# Patient Record
Sex: MALE
[De-identification: names, ages, dates, MRNs, and addresses within clinical notes are randomized per-mention and may not be internally consistent; named-entity substitution may affect disease eponyms.]

---

## 2022-07-26 PROBLEM — Z00.00 ENCOUNTER FOR PREVENTIVE HEALTH EXAMINATION: Status: ACTIVE | Noted: 2022-07-26

## 2022-07-27 ENCOUNTER — APPOINTMENT (OUTPATIENT)
Dept: SURGICAL ONCOLOGY | Facility: CLINIC | Age: 37
End: 2022-07-27

## 2022-07-27 VITALS
BODY MASS INDEX: 26.22 KG/M2 | SYSTOLIC BLOOD PRESSURE: 141 MMHG | HEART RATE: 81 BPM | HEIGHT: 69 IN | RESPIRATION RATE: 17 BRPM | OXYGEN SATURATION: 99 % | WEIGHT: 177 LBS | DIASTOLIC BLOOD PRESSURE: 95 MMHG | TEMPERATURE: 98.7 F

## 2022-07-27 DIAGNOSIS — Z86.39 PERSONAL HISTORY OF OTHER ENDOCRINE, NUTRITIONAL AND METABOLIC DISEASE: ICD-10-CM

## 2022-07-27 DIAGNOSIS — Z86.79 PERSONAL HISTORY OF OTHER DISEASES OF THE CIRCULATORY SYSTEM: ICD-10-CM

## 2022-07-27 DIAGNOSIS — Z78.9 OTHER SPECIFIED HEALTH STATUS: ICD-10-CM

## 2022-07-27 PROCEDURE — 99204 OFFICE O/P NEW MOD 45 MIN: CPT

## 2022-08-01 ENCOUNTER — OUTPATIENT (OUTPATIENT)
Dept: OUTPATIENT SERVICES | Facility: HOSPITAL | Age: 37
LOS: 1 days | End: 2022-08-01

## 2022-08-01 VITALS
RESPIRATION RATE: 16 BRPM | HEIGHT: 68 IN | HEART RATE: 86 BPM | DIASTOLIC BLOOD PRESSURE: 90 MMHG | TEMPERATURE: 99 F | WEIGHT: 182.1 LBS | SYSTOLIC BLOOD PRESSURE: 130 MMHG

## 2022-08-01 DIAGNOSIS — E11.9 TYPE 2 DIABETES MELLITUS WITHOUT COMPLICATIONS: ICD-10-CM

## 2022-08-01 DIAGNOSIS — K76.9 LIVER DISEASE, UNSPECIFIED: ICD-10-CM

## 2022-08-01 DIAGNOSIS — I10 ESSENTIAL (PRIMARY) HYPERTENSION: ICD-10-CM

## 2022-08-01 DIAGNOSIS — Q24.9 CONGENITAL MALFORMATION OF HEART, UNSPECIFIED: ICD-10-CM

## 2022-08-01 DIAGNOSIS — J45.909 UNSPECIFIED ASTHMA, UNCOMPLICATED: ICD-10-CM

## 2022-08-01 DIAGNOSIS — G47.33 OBSTRUCTIVE SLEEP APNEA (ADULT) (PEDIATRIC): ICD-10-CM

## 2022-08-01 LAB
ALBUMIN SERPL ELPH-MCNC: 4.8 G/DL — SIGNIFICANT CHANGE UP (ref 3.3–5)
ALP SERPL-CCNC: 43 U/L — SIGNIFICANT CHANGE UP (ref 40–120)
ALT FLD-CCNC: 26 U/L — SIGNIFICANT CHANGE UP (ref 4–41)
ANION GAP SERPL CALC-SCNC: 15 MMOL/L — HIGH (ref 7–14)
AST SERPL-CCNC: 18 U/L — SIGNIFICANT CHANGE UP (ref 4–40)
BILIRUB SERPL-MCNC: 0.2 MG/DL — SIGNIFICANT CHANGE UP (ref 0.2–1.2)
BLD GP AB SCN SERPL QL: NEGATIVE — SIGNIFICANT CHANGE UP
BUN SERPL-MCNC: 18 MG/DL — SIGNIFICANT CHANGE UP (ref 7–23)
CALCIUM SERPL-MCNC: 9.6 MG/DL — SIGNIFICANT CHANGE UP (ref 8.4–10.5)
CHLORIDE SERPL-SCNC: 102 MMOL/L — SIGNIFICANT CHANGE UP (ref 98–107)
CO2 SERPL-SCNC: 24 MMOL/L — SIGNIFICANT CHANGE UP (ref 22–31)
CREAT SERPL-MCNC: 1.07 MG/DL — SIGNIFICANT CHANGE UP (ref 0.5–1.3)
EGFR: 91 ML/MIN/1.73M2 — SIGNIFICANT CHANGE UP
GLUCOSE SERPL-MCNC: 136 MG/DL — HIGH (ref 70–99)
HCT VFR BLD CALC: 40.7 % — SIGNIFICANT CHANGE UP (ref 39–50)
HGB BLD-MCNC: 13.1 G/DL — SIGNIFICANT CHANGE UP (ref 13–17)
MCHC RBC-ENTMCNC: 24.8 PG — LOW (ref 27–34)
MCHC RBC-ENTMCNC: 32.2 GM/DL — SIGNIFICANT CHANGE UP (ref 32–36)
MCV RBC AUTO: 76.9 FL — LOW (ref 80–100)
NRBC # BLD: 0 /100 WBCS — SIGNIFICANT CHANGE UP
NRBC # FLD: 0 K/UL — SIGNIFICANT CHANGE UP
PLATELET # BLD AUTO: 289 K/UL — SIGNIFICANT CHANGE UP (ref 150–400)
POTASSIUM SERPL-MCNC: 3.4 MMOL/L — LOW (ref 3.5–5.3)
POTASSIUM SERPL-SCNC: 3.4 MMOL/L — LOW (ref 3.5–5.3)
PROT SERPL-MCNC: 7.4 G/DL — SIGNIFICANT CHANGE UP (ref 6–8.3)
RBC # BLD: 5.29 M/UL — SIGNIFICANT CHANGE UP (ref 4.2–5.8)
RBC # FLD: 14.1 % — SIGNIFICANT CHANGE UP (ref 10.3–14.5)
RH IG SCN BLD-IMP: POSITIVE — SIGNIFICANT CHANGE UP
SODIUM SERPL-SCNC: 141 MMOL/L — SIGNIFICANT CHANGE UP (ref 135–145)
WBC # BLD: 7.65 K/UL — SIGNIFICANT CHANGE UP (ref 3.8–10.5)
WBC # FLD AUTO: 7.65 K/UL — SIGNIFICANT CHANGE UP (ref 3.8–10.5)

## 2022-08-01 RX ORDER — SODIUM CHLORIDE 9 MG/ML
1000 INJECTION, SOLUTION INTRAVENOUS
Refills: 0 | Status: DISCONTINUED | OUTPATIENT
Start: 2022-08-05 | End: 2022-08-06

## 2022-08-01 NOTE — H&P PST ADULT - PROBLEM SELECTOR PLAN 1
Scheduled for robotic partial liver resection ,cholecystectomy   Preop instructions provided and patient verbalizes understanding.  Labs done and results pending. Hibiclens provided with instructions and was signed by patient. Teach-back method was utilized to assess patient's understanding. Patient verbalized understanding.    Covid test scheduled or tomorrow with pcp , as stated by patient .   Medical clearance as per Dr Bran , pcp has requested cardiac clearance . PST requesting both evaluations with current EKG to be faxed .

## 2022-08-01 NOTE — H&P PST ADULT - PROBLEM SELECTOR PLAN 4
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 Requesting cardiac clearance to be faxed with clarity of history .

## 2022-08-01 NOTE — H&P PST ADULT - NSICDXPASTMEDICALHX_GEN_ALL_CORE_FT
PAST MEDICAL HISTORY:  Asthma mild - no inhaler    Congenital heart disease cardiac eval 1 year ago    Hypercholesteremia     Hypertension     Type 2 diabetes mellitus

## 2022-08-01 NOTE — H&P PST ADULT - HISTORY OF PRESENT ILLNESS
This is a 36 y.o. male with liver disease unspecified , cholesterolosis of gallbladder . Pt had abnormal abdominal sono 2020 , follow up MRI  done , pt with serial sono of abdomen every 6 months . Pt had last 6/22 , follow up MRI of abdomen , chest CT 7/22. Pt referred to Dr Bran . Pt is scheduled for surgery 8/5/22. Pt offers no complaints in pst.

## 2022-08-01 NOTE — H&P PST ADULT - PROBLEM SELECTOR PLAN 3
bloods pending in pst . Last metformin 8/4 in am . STAT FS upon admit , monitor FS during hospital stay.

## 2022-08-03 NOTE — PHYSICAL EXAM
[Normal] : supple, no neck mass and thyroid not enlarged [Normal Neck Lymph Nodes] : normal neck lymph nodes  [Normal Supraclavicular Lymph Nodes] : normal supraclavicular lymph nodes [Normal Groin Lymph Nodes] : normal groin lymph nodes [Normal Axillary Lymph Nodes] : normal axillary lymph nodes [Normal] : oriented to person, place and time, with appropriate affect [FreeTextEntry1] : COVID-19 precautions as per [facility] policy was universally followed

## 2022-08-03 NOTE — CONSULT LETTER
[Dear  ___] : Dear  [unfilled], [( Thank you for referring [unfilled] for consultation for _____ )] : Thank you for referring [unfilled] for consultation for [unfilled] [Please see my note below.] : Please see my note below. [Consult Closing:] : Thank you very much for allowing me to participate in the care of this patient.  If you have any questions, please do not hesitate to contact me. [Sincerely,] : Sincerely, [FreeTextEntry2] : Howard Ferraro MD [FreeTextEntry3] : Shant Crockett MD, FICS, FACS\par Director of Surgical Oncology- [facility] \par , Department of Surgery  \par [facility] \par [address]\[place] [address]\par \par [address]\[place] [address]\par \par [address]\[place] [address]\par \par (mob) [phone]\par (o) [phone]\par (f) [phone]\par

## 2022-08-03 NOTE — HISTORY OF PRESENT ILLNESS
[de-identified] : Mr. DEENA UMANZOR is a 36 year old man, referred by Dr. Howard Ferraro, presents today for an initial consultation for a liver mass. \par \par Deena initially complained of intermittent abdominal pain (LUQ) in 2020, that was noted to be gallbladder polyps (imaging & report not available). He denies any abdominal pain, nausea, vomiting or associated symptoms. He is planning on moving [facility] at the end of the summer which he states prompted a repeat abdominal ultrasound (7/8/2022)\par US showed: indeterminate liver lesion, small echogenic areas in the gallbladder (likely polyps), simple appearing bilateral kidney cysts.\par \par A follow-up abdominal MRI on 7/22/2022 showed liver lesion in the lateral segment left lobe that demonstrates arterial enhancement measuring up to 2.0 x 2.0 cm. By the portal venous phase there is evidence of washout and pseudocapsule. The lesion appears mildly T2 hyperintense, there is no significant associated restricted diffusion, no definite fat identified within the lesion, lesion was present on prior CT angiogram from 2020 at that time it measured 1.7 cm \par \par CEA & AFP WNL, he reports a normal hepatitis panel done by Dr. Ferraro \par \par He reports only symptom of vague LUQ pain that is intermittent but has persisted over the last 2 years (attributed to gallbladder polyps). Denies nausea, vomiting, diarrhea or weight loss. Reports regular appetite and weight is well maintained, along with regular bowel movements.

## 2022-08-03 NOTE — ASSESSMENT
[FreeTextEntry1] : IMP:\par 35yo M with liver lesion, first noted in 2020 measuring 1.7 cm\par now measuring 2.0 x 2.0 cm in 2022, and multiple gallbladder polyps (reports frequent LUQ pain)\par \par Discussed options of biopsy to lesion/watchful waiting vs surgical resection, patient & wife present, both in agreement to proceed with surgery. \par \par PLAN:\par robotic partial liver resection & cholecystectomy\par \par I have discussed the risks, benefits, alternatives, complications including but not limited bleeding, infection, damage to adjacent structures, sepsis, need for further procedures, sepsis, tumor recurrence to the patient in detail. Patient expressed verbal understanding. Written informed consent to be obtained in the preoperative period \par \par \par I have discussed the diagnosis, therapeutic plan and options with the patient at length. Patient expressed verbal understanding to proceed with the proposed plan. All questions answered.

## 2022-08-04 NOTE — ASU PATIENT PROFILE, ADULT - FALL HARM RISK - UNIVERSAL INTERVENTIONS
Bed in lowest position, wheels locked, appropriate side rails in place/Call bell, personal items and telephone in reach/Instruct patient to call for assistance before getting out of bed or chair/Non-slip footwear when patient is out of bed/[place] to call system/Physically safe environment - no spills, clutter or unnecessary equipment/Purposeful Proactive Rounding/Room/bathroom lighting operational, light cord in reach

## 2022-08-05 ENCOUNTER — RESULT REVIEW (OUTPATIENT)
Age: 37
End: 2022-08-05

## 2022-08-05 ENCOUNTER — INPATIENT (INPATIENT)
Facility: HOSPITAL | Age: 37
LOS: 3 days | Discharge: HOME | End: 2022-08-09
Attending: SURGERY | Admitting: SURGERY

## 2022-08-05 ENCOUNTER — APPOINTMENT (OUTPATIENT)
Dept: SURGICAL ONCOLOGY | Facility: HOSPITAL | Age: 37
End: 2022-08-05

## 2022-08-05 ENCOUNTER — TRANSCRIPTION ENCOUNTER (OUTPATIENT)
Age: 37
End: 2022-08-05

## 2022-08-05 VITALS
SYSTOLIC BLOOD PRESSURE: 150 MMHG | TEMPERATURE: 98 F | HEIGHT: 68 IN | WEIGHT: 182.1 LBS | RESPIRATION RATE: 16 BRPM | OXYGEN SATURATION: 100 % | DIASTOLIC BLOOD PRESSURE: 95 MMHG | HEART RATE: 82 BPM

## 2022-08-05 DIAGNOSIS — K76.9 LIVER DISEASE, UNSPECIFIED: ICD-10-CM

## 2022-08-05 LAB
ANION GAP SERPL CALC-SCNC: 12 MMOL/L — SIGNIFICANT CHANGE UP (ref 7–14)
BUN SERPL-MCNC: 14 MG/DL — SIGNIFICANT CHANGE UP (ref 7–23)
CALCIUM SERPL-MCNC: 9.3 MG/DL — SIGNIFICANT CHANGE UP (ref 8.4–10.5)
CHLORIDE SERPL-SCNC: 102 MMOL/L — SIGNIFICANT CHANGE UP (ref 98–107)
CO2 SERPL-SCNC: 25 MMOL/L — SIGNIFICANT CHANGE UP (ref 22–31)
CREAT SERPL-MCNC: 1.04 MG/DL — SIGNIFICANT CHANGE UP (ref 0.5–1.3)
EGFR: 94 ML/MIN/1.73M2 — SIGNIFICANT CHANGE UP
GLUCOSE BLDC GLUCOMTR-MCNC: 138 MG/DL — HIGH (ref 70–99)
GLUCOSE BLDC GLUCOMTR-MCNC: 158 MG/DL — HIGH (ref 70–99)
GLUCOSE SERPL-MCNC: 150 MG/DL — HIGH (ref 70–99)
HCT VFR BLD CALC: 38.4 % — LOW (ref 39–50)
HGB BLD-MCNC: 12.8 G/DL — LOW (ref 13–17)
MAGNESIUM SERPL-MCNC: 1.8 MG/DL — SIGNIFICANT CHANGE UP (ref 1.6–2.6)
MCHC RBC-ENTMCNC: 25.4 PG — LOW (ref 27–34)
MCHC RBC-ENTMCNC: 33.3 GM/DL — SIGNIFICANT CHANGE UP (ref 32–36)
MCV RBC AUTO: 76.3 FL — LOW (ref 80–100)
NRBC # BLD: 0 /100 WBCS — SIGNIFICANT CHANGE UP
NRBC # FLD: 0 K/UL — SIGNIFICANT CHANGE UP
PHOSPHATE SERPL-MCNC: 2.9 MG/DL — SIGNIFICANT CHANGE UP (ref 2.5–4.5)
PLATELET # BLD AUTO: 226 K/UL — SIGNIFICANT CHANGE UP (ref 150–400)
POTASSIUM SERPL-MCNC: 3.7 MMOL/L — SIGNIFICANT CHANGE UP (ref 3.5–5.3)
POTASSIUM SERPL-SCNC: 3.7 MMOL/L — SIGNIFICANT CHANGE UP (ref 3.5–5.3)
RBC # BLD: 5.03 M/UL — SIGNIFICANT CHANGE UP (ref 4.2–5.8)
RBC # FLD: 14.1 % — SIGNIFICANT CHANGE UP (ref 10.3–14.5)
RH IG SCN BLD-IMP: POSITIVE — SIGNIFICANT CHANGE UP
SODIUM SERPL-SCNC: 139 MMOL/L — SIGNIFICANT CHANGE UP (ref 135–145)
WBC # BLD: 10.88 K/UL — HIGH (ref 3.8–10.5)
WBC # FLD AUTO: 10.88 K/UL — HIGH (ref 3.8–10.5)

## 2022-08-05 PROCEDURE — 49905 OMENTAL FLAP INTRA-ABDOM: CPT

## 2022-08-05 PROCEDURE — 88304 TISSUE EXAM BY PATHOLOGIST: CPT | Mod: 26

## 2022-08-05 PROCEDURE — 88313 SPECIAL STAINS GROUP 2: CPT | Mod: 26

## 2022-08-05 PROCEDURE — 88341 IMHCHEM/IMCYTCHM EA ADD ANTB: CPT | Mod: 26

## 2022-08-05 PROCEDURE — 88334 PATH CONSLTJ SURG CYTO XM EA: CPT | Mod: 26,59

## 2022-08-05 PROCEDURE — 88331 PATH CONSLTJ SURG 1 BLK 1SPC: CPT | Mod: 26

## 2022-08-05 PROCEDURE — 88342 IMHCHEM/IMCYTCHM 1ST ANTB: CPT | Mod: 26

## 2022-08-05 PROCEDURE — 76998 US GUIDE INTRAOP: CPT | Mod: 26

## 2022-08-05 PROCEDURE — 47120 PARTIAL REMOVAL OF LIVER: CPT

## 2022-08-05 PROCEDURE — 88307 TISSUE EXAM BY PATHOLOGIST: CPT | Mod: 26

## 2022-08-05 PROCEDURE — S2900 ROBOTIC SURGICAL SYSTEM: CPT | Mod: NC

## 2022-08-05 PROCEDURE — 93010 ELECTROCARDIOGRAM REPORT: CPT

## 2022-08-05 DEVICE — SURGICEL 2 X 14": Type: IMPLANTABLE DEVICE | Status: FUNCTIONAL

## 2022-08-05 DEVICE — LIGATING CLIPS WECK HEMOLOK POLYMER LARGE (PURPLE) 6: Type: IMPLANTABLE DEVICE | Status: FUNCTIONAL

## 2022-08-05 DEVICE — VISTASEAL FIBRIN HUMAN 10ML: Type: IMPLANTABLE DEVICE | Status: FUNCTIONAL

## 2022-08-05 RX ORDER — METOCLOPRAMIDE HCL 10 MG
10 TABLET ORAL ONCE
Refills: 0 | Status: COMPLETED | OUTPATIENT
Start: 2022-08-05 | End: 2022-08-06

## 2022-08-05 RX ORDER — CEFOTETAN DISODIUM 1 G
2 VIAL (EA) INJECTION EVERY 12 HOURS
Refills: 0 | Status: COMPLETED | OUTPATIENT
Start: 2022-08-06 | End: 2022-08-06

## 2022-08-05 RX ORDER — LANOLIN ALCOHOL/MO/W.PET/CERES
6 CREAM (GRAM) TOPICAL ONCE
Refills: 0 | Status: COMPLETED | OUTPATIENT
Start: 2022-08-05 | End: 2022-08-06

## 2022-08-05 RX ORDER — MONTELUKAST 4 MG/1
10 TABLET, CHEWABLE ORAL DAILY
Refills: 0 | Status: DISCONTINUED | OUTPATIENT
Start: 2022-08-05 | End: 2022-08-09

## 2022-08-05 RX ORDER — ENOXAPARIN SODIUM 100 MG/ML
40 INJECTION SUBCUTANEOUS EVERY 24 HOURS
Refills: 0 | Status: DISCONTINUED | OUTPATIENT
Start: 2022-08-05 | End: 2022-08-09

## 2022-08-05 RX ORDER — ACETAMINOPHEN 500 MG
2 TABLET ORAL
Qty: 0 | Refills: 0 | DISCHARGE
Start: 2022-08-05

## 2022-08-05 RX ORDER — SIMETHICONE 80 MG/1
80 TABLET, CHEWABLE ORAL ONCE
Refills: 0 | Status: COMPLETED | OUTPATIENT
Start: 2022-08-05 | End: 2022-08-05

## 2022-08-05 RX ORDER — FENTANYL CITRATE 50 UG/ML
50 INJECTION INTRAVENOUS
Refills: 0 | Status: DISCONTINUED | OUTPATIENT
Start: 2022-08-05 | End: 2022-08-05

## 2022-08-05 RX ORDER — SODIUM CHLORIDE 9 MG/ML
1000 INJECTION, SOLUTION INTRAVENOUS
Refills: 0 | Status: DISCONTINUED | OUTPATIENT
Start: 2022-08-05 | End: 2022-08-09

## 2022-08-05 RX ORDER — OXYCODONE HYDROCHLORIDE 5 MG/1
5 TABLET ORAL ONCE
Refills: 0 | Status: DISCONTINUED | OUTPATIENT
Start: 2022-08-05 | End: 2022-08-05

## 2022-08-05 RX ORDER — MONTELUKAST 4 MG/1
1 TABLET, CHEWABLE ORAL
Qty: 0 | Refills: 0 | DISCHARGE

## 2022-08-05 RX ORDER — DEXTROSE 50 % IN WATER 50 %
25 SYRINGE (ML) INTRAVENOUS ONCE
Refills: 0 | Status: DISCONTINUED | OUTPATIENT
Start: 2022-08-05 | End: 2022-08-09

## 2022-08-05 RX ORDER — FOLIC ACID 0.8 MG
1 TABLET ORAL
Qty: 0 | Refills: 0 | DISCHARGE

## 2022-08-05 RX ORDER — METFORMIN HYDROCHLORIDE 850 MG/1
1 TABLET ORAL
Qty: 0 | Refills: 0 | DISCHARGE

## 2022-08-05 RX ORDER — CYCLOBENZAPRINE HYDROCHLORIDE 10 MG/1
10 TABLET, FILM COATED ORAL ONCE
Refills: 0 | Status: COMPLETED | OUTPATIENT
Start: 2022-08-05 | End: 2022-08-05

## 2022-08-05 RX ORDER — ACETAMINOPHEN 500 MG
975 TABLET ORAL EVERY 6 HOURS
Refills: 0 | Status: DISCONTINUED | OUTPATIENT
Start: 2022-08-05 | End: 2022-08-09

## 2022-08-05 RX ORDER — FENOFIBRATE,MICRONIZED 130 MG
145 CAPSULE ORAL DAILY
Refills: 0 | Status: DISCONTINUED | OUTPATIENT
Start: 2022-08-05 | End: 2022-08-09

## 2022-08-05 RX ORDER — ACETAMINOPHEN 500 MG
650 TABLET ORAL EVERY 6 HOURS
Refills: 0 | Status: DISCONTINUED | OUTPATIENT
Start: 2022-08-05 | End: 2022-08-05

## 2022-08-05 RX ORDER — LOSARTAN/HYDROCHLOROTHIAZIDE 100MG-25MG
1 TABLET ORAL
Qty: 0 | Refills: 0 | DISCHARGE

## 2022-08-05 RX ORDER — INSULIN LISPRO 100/ML
VIAL (ML) SUBCUTANEOUS AT BEDTIME
Refills: 0 | Status: DISCONTINUED | OUTPATIENT
Start: 2022-08-05 | End: 2022-08-09

## 2022-08-05 RX ORDER — GLUCAGON INJECTION, SOLUTION 0.5 MG/.1ML
1 INJECTION, SOLUTION SUBCUTANEOUS ONCE
Refills: 0 | Status: DISCONTINUED | OUTPATIENT
Start: 2022-08-05 | End: 2022-08-09

## 2022-08-05 RX ORDER — MAGNESIUM SULFATE 500 MG/ML
1 VIAL (ML) INJECTION ONCE
Refills: 0 | Status: COMPLETED | OUTPATIENT
Start: 2022-08-05 | End: 2022-08-05

## 2022-08-05 RX ORDER — INSULIN LISPRO 100/ML
VIAL (ML) SUBCUTANEOUS
Refills: 0 | Status: DISCONTINUED | OUTPATIENT
Start: 2022-08-05 | End: 2022-08-09

## 2022-08-05 RX ORDER — DEXTROSE 50 % IN WATER 50 %
15 SYRINGE (ML) INTRAVENOUS ONCE
Refills: 0 | Status: DISCONTINUED | OUTPATIENT
Start: 2022-08-05 | End: 2022-08-09

## 2022-08-05 RX ORDER — OXYCODONE HYDROCHLORIDE 5 MG/1
10 TABLET ORAL ONCE
Refills: 0 | Status: DISCONTINUED | OUTPATIENT
Start: 2022-08-05 | End: 2022-08-05

## 2022-08-05 RX ORDER — POTASSIUM CHLORIDE 20 MEQ
20 PACKET (EA) ORAL ONCE
Refills: 0 | Status: COMPLETED | OUTPATIENT
Start: 2022-08-05 | End: 2022-08-07

## 2022-08-05 RX ORDER — FENOFIBRATE,MICRONIZED 130 MG
1 CAPSULE ORAL
Qty: 0 | Refills: 0 | DISCHARGE

## 2022-08-05 RX ORDER — FENTANYL CITRATE 50 UG/ML
25 INJECTION INTRAVENOUS
Refills: 0 | Status: DISCONTINUED | OUTPATIENT
Start: 2022-08-05 | End: 2022-08-05

## 2022-08-05 RX ORDER — DEXTROSE 50 % IN WATER 50 %
12.5 SYRINGE (ML) INTRAVENOUS ONCE
Refills: 0 | Status: DISCONTINUED | OUTPATIENT
Start: 2022-08-05 | End: 2022-08-09

## 2022-08-05 RX ORDER — ONDANSETRON 8 MG/1
4 TABLET, FILM COATED ORAL ONCE
Refills: 0 | Status: COMPLETED | OUTPATIENT
Start: 2022-08-05 | End: 2022-08-05

## 2022-08-05 RX ADMIN — Medication 2: at 16:14

## 2022-08-05 RX ADMIN — FENTANYL CITRATE 50 MICROGRAM(S): 50 INJECTION INTRAVENOUS at 17:15

## 2022-08-05 RX ADMIN — SODIUM CHLORIDE 75 MILLILITER(S): 9 INJECTION, SOLUTION INTRAVENOUS at 16:14

## 2022-08-05 RX ADMIN — FENTANYL CITRATE 50 MICROGRAM(S): 50 INJECTION INTRAVENOUS at 18:07

## 2022-08-05 RX ADMIN — Medication 100 GRAM(S): at 17:40

## 2022-08-05 RX ADMIN — Medication 650 MILLIGRAM(S): at 18:59

## 2022-08-05 RX ADMIN — SIMETHICONE 80 MILLIGRAM(S): 80 TABLET, CHEWABLE ORAL at 21:15

## 2022-08-05 RX ADMIN — ONDANSETRON 4 MILLIGRAM(S): 8 TABLET, FILM COATED ORAL at 19:59

## 2022-08-05 RX ADMIN — FENTANYL CITRATE 50 MICROGRAM(S): 50 INJECTION INTRAVENOUS at 16:53

## 2022-08-05 RX ADMIN — FENTANYL CITRATE 50 MICROGRAM(S): 50 INJECTION INTRAVENOUS at 18:59

## 2022-08-05 RX ADMIN — CYCLOBENZAPRINE HYDROCHLORIDE 10 MILLIGRAM(S): 10 TABLET, FILM COATED ORAL at 19:46

## 2022-08-05 RX ADMIN — Medication 650 MILLIGRAM(S): at 18:27

## 2022-08-05 NOTE — PATIENT PROFILE ADULT - FALL HARM RISK - HARM RISK INTERVENTIONS

## 2022-08-05 NOTE — DISCHARGE NOTE PROVIDER - NSDCMRMEDTOKEN_GEN_ALL_CORE_FT
acetaminophen 325 mg oral tablet: 2 tab(s) orally every 6 hours  fenofibrate 145 mg oral tablet: 1 tab(s) orally once a day, pm  folic acid 1 mg oral tablet: 1 tab(s) orally once a day, pm  losartan-hydrochlorothiazide 100 mg-25 mg oral tablet: 1 tab(s) orally once a day, pm  metFORMIN 1000 mg oral tablet: 1 tab(s) orally once a day, pm  montelukast 10 mg oral tablet: 1 tab(s) orally once a day, pm   acetaminophen 325 mg oral tablet: 2 tab(s) orally every 6 hours  cyclobenzaprine 5 mg oral tablet: 1 tab(s) orally every 8 hours, As needed, Muscle Spasm MDD:3 tabs  fenofibrate 145 mg oral tablet: 1 tab(s) orally once a day, pm  folic acid 1 mg oral tablet: 1 tab(s) orally once a day, pm  gabapentin 300 mg oral capsule: 1 cap(s) orally 3 times a day MDD:3 tabs  losartan-hydrochlorothiazide 100 mg-25 mg oral tablet: 1 tab(s) orally once a day, pm  metFORMIN 1000 mg oral tablet: 1 tab(s) orally once a day, pm  montelukast 10 mg oral tablet: 1 tab(s) orally once a day, pm   cyclobenzaprine 5 mg oral tablet: 1 tab(s) orally every 8 hours, As needed, Muscle Spasm MDD:3 tabs  fenofibrate 145 mg oral tablet: 1 tab(s) orally once a day, pm  folic acid 1 mg oral tablet: 1 tab(s) orally once a day, pm  gabapentin 300 mg oral capsule: 1 cap(s) orally 3 times a day MDD:3 tabs  losartan-hydrochlorothiazide 100 mg-25 mg oral tablet: 1 tab(s) orally once a day, pm  metFORMIN 1000 mg oral tablet: 1 tab(s) orally once a day, pm  montelukast 10 mg oral tablet: 1 tab(s) orally once a day, pm  oxyCODONE 5 mg oral tablet: 1 tab(s) orally every 6 hours, As Needed -for moderate pain MDD:4  Tylenol 500 mg oral tablet: 2 tab(s) orally every 6 hours for mild pain

## 2022-08-05 NOTE — DISCHARGE NOTE PROVIDER - CARE PROVIDER_API CALL
Shant Hunter)  Surgery  [address], Division [place] [address]  Phone: [phone]  Fax: [phone]  Follow Up Time: 1 week

## 2022-08-05 NOTE — DISCHARGE NOTE PROVIDER - NSDCACTIVITY_GEN_ALL_CORE
Showering allowed/No heavy lifting/straining/Follow Instructions Provided by your Surgical Team Do not drive or operate machinery/Showering allowed/Stairs allowed/Walking - Indoors allowed/No heavy lifting/straining/Walking - Outdoors allowed/Follow Instructions Provided by your Surgical Team

## 2022-08-05 NOTE — BRIEF OPERATIVE NOTE - NSICDXBRIEFPROCEDURE_GEN_ALL_CORE_FT
PROCEDURES:  Robot-assisted cholecystectomy 05-Aug-2022 15:10:07  Paris Ya  Robot-assisted laparoscopic wedge resection of liver using da Karrie Si 05-Aug-2022 15:10:23  Paris Ya

## 2022-08-05 NOTE — BRIEF OPERATIVE NOTE - NSICDXBRIEFPREOP_GEN_ALL_CORE_FT
PRE-OP DIAGNOSIS:  Gallbladder polyp 05-Aug-2022 15:10:33  Paris Ya  Liver lesion 05-Aug-2022 15:10:53  Paris Ya

## 2022-08-05 NOTE — DISCHARGE NOTE PROVIDER - NSDCCPCAREPLAN_GEN_ALL_CORE_FT
PRINCIPAL DISCHARGE DIAGNOSIS  Diagnosis: Liver disease, unspecified  Assessment and Plan of Treatment: WOUND CARE:  Please keep incisions clean and dry. Please do not Scrub or rub incisions. Do not use lotion or powder on incisions.   BATHING: You may shower and/or sponge bathe. You may use warm soapy water in the shower and rinse, pat dry.  ACTIVITY: No heavy lifting or straining. Otherwise, you may return to your usual level of physical activity. If you are taking narcotic pain medication DO NOT drive a car, operate machinery or make important decisions.  DIET: Return to your usual diet.  NOTIFY YOUR SURGEON IF YOU HAVE: any bleeding that does not stop, any pus draining from your wound(s), any fever (over 100.4 F) persistent nausea/vomiting, or if your pain is not controlled on your discharge pain medications, unable to urinate.  FOLLOW UP:  1. Please follow up with your primary care physician in one week regarding your hospitalization, bring copies of your discharge paperwork.  2. Please follow up with your surgeon, Dr. Crockett. Call [phone] to make an appointment.

## 2022-08-05 NOTE — PATIENT PROFILE ADULT - PATIENT'S PREFERRED PRONOUN
Him/He Trilobed Flap Text: The defect edges were debeveled with a #15 scalpel blade.  Given the location of the defect and the proximity to free margins a trilobed flap was deemed most appropriate.  Using a sterile surgical marker, an appropriate trilobed flap drawn around the defect.    The area thus outlined was incised deep to adipose tissue with a #15 scalpel blade.  The skin margins were undermined to an appropriate distance in all directions utilizing iris scissors.

## 2022-08-05 NOTE — BRIEF OPERATIVE NOTE - OPERATION/FINDINGS
wedge resection of segment 3 of liver, cholecystectomy performed, cystic duct and artery identified and clipped, gallbladder removed from liver bed, hemostasis achieved

## 2022-08-05 NOTE — DISCHARGE NOTE PROVIDER - HOSPITAL COURSE
Patient presented to [facility] On 8/5/22 with gallbladder polyp and liver lesion; scheduled surgery with Dr. Crockett. Patient taken to the OR by Dr. Crockett and underwent wedge resection of segment 3 of liver, cholecystectomy. Patient tolerated operation well and there were no post-operative complications identified. Patient remained hemodynamically stable in the PACU and transferred to the surgical floor. Diet was restarted and advanced as tolerated. Pain control was transitioned from IV to PO pain meds. At this time, patient is currently ambulating, voiding, tolerating a regular diet. Pain well controlled on PO pain meds. Patient has been deemed stable for discharge home with follow up as an outpatient. Patient presented to [facility] On 8/5/22 with gallbladder polyp and liver lesion; scheduled surgery with Dr. Crockett. Patient taken to the OR by Dr. Crockett and underwent wedge resection of segment 3 of liver, cholecystectomy. Patient tolerated operation well and there were no post-operative complications identified. Patient remained hemodynamically stable in the PACU and transferred to the surgical floor. Diet was restarted and advanced as tolerated. Pain control was transitioned from IV to PO pain meds. At this time, patient is currently ambulating, voiding, tolerating a regular diet.  Pain well controlled on PO pain meds.   Diet well tolerated and pt is OOB ambulating     Patient has been deemed stable for discharge home with follow up as an outpatient.

## 2022-08-05 NOTE — DISCHARGE NOTE PROVIDER - NSDCCPTREATMENT_GEN_ALL_CORE_FT
PRINCIPAL PROCEDURE  Procedure: Robot-assisted cholecystectomy  Findings and Treatment:       SECONDARY PROCEDURE  Procedure: Robot-assisted laparoscopic wedge resection of liver using da Karrie Si  Findings and Treatment:

## 2022-08-06 LAB
A1C WITH ESTIMATED AVERAGE GLUCOSE RESULT: 7 % — HIGH (ref 4–5.6)
ALBUMIN SERPL ELPH-MCNC: 4.5 G/DL — SIGNIFICANT CHANGE UP (ref 3.3–5)
ALP SERPL-CCNC: 39 U/L — LOW (ref 40–120)
ALT FLD-CCNC: 139 U/L — HIGH (ref 4–41)
ANION GAP SERPL CALC-SCNC: 12 MMOL/L — SIGNIFICANT CHANGE UP (ref 7–14)
AST SERPL-CCNC: 111 U/L — HIGH (ref 4–40)
BILIRUB DIRECT SERPL-MCNC: <0.2 MG/DL — SIGNIFICANT CHANGE UP (ref 0–0.3)
BILIRUB INDIRECT FLD-MCNC: >0.3 MG/DL — SIGNIFICANT CHANGE UP (ref 0–1)
BILIRUB SERPL-MCNC: 0.5 MG/DL — SIGNIFICANT CHANGE UP (ref 0.2–1.2)
BUN SERPL-MCNC: 14 MG/DL — SIGNIFICANT CHANGE UP (ref 7–23)
CALCIUM SERPL-MCNC: 9 MG/DL — SIGNIFICANT CHANGE UP (ref 8.4–10.5)
CHLORIDE SERPL-SCNC: 97 MMOL/L — LOW (ref 98–107)
CO2 SERPL-SCNC: 26 MMOL/L — SIGNIFICANT CHANGE UP (ref 22–31)
CREAT SERPL-MCNC: 1.03 MG/DL — SIGNIFICANT CHANGE UP (ref 0.5–1.3)
EGFR: 95 ML/MIN/1.73M2 — SIGNIFICANT CHANGE UP
ESTIMATED AVERAGE GLUCOSE: 154 — SIGNIFICANT CHANGE UP
GLUCOSE BLDC GLUCOMTR-MCNC: 104 MG/DL — HIGH (ref 70–99)
GLUCOSE BLDC GLUCOMTR-MCNC: 135 MG/DL — HIGH (ref 70–99)
GLUCOSE BLDC GLUCOMTR-MCNC: 138 MG/DL — HIGH (ref 70–99)
GLUCOSE SERPL-MCNC: 146 MG/DL — HIGH (ref 70–99)
HCT VFR BLD CALC: 36.5 % — LOW (ref 39–50)
HGB BLD-MCNC: 12.2 G/DL — LOW (ref 13–17)
MAGNESIUM SERPL-MCNC: 2 MG/DL — SIGNIFICANT CHANGE UP (ref 1.6–2.6)
MCHC RBC-ENTMCNC: 25.8 PG — LOW (ref 27–34)
MCHC RBC-ENTMCNC: 33.4 GM/DL — SIGNIFICANT CHANGE UP (ref 32–36)
MCV RBC AUTO: 77.2 FL — LOW (ref 80–100)
NRBC # BLD: 0 /100 WBCS — SIGNIFICANT CHANGE UP
NRBC # FLD: 0 K/UL — SIGNIFICANT CHANGE UP
PHOSPHATE SERPL-MCNC: 3.8 MG/DL — SIGNIFICANT CHANGE UP (ref 2.5–4.5)
PLATELET # BLD AUTO: 221 K/UL — SIGNIFICANT CHANGE UP (ref 150–400)
POTASSIUM SERPL-MCNC: 3.4 MMOL/L — LOW (ref 3.5–5.3)
POTASSIUM SERPL-SCNC: 3.4 MMOL/L — LOW (ref 3.5–5.3)
PROT SERPL-MCNC: 6.7 G/DL — SIGNIFICANT CHANGE UP (ref 6–8.3)
RBC # BLD: 4.73 M/UL — SIGNIFICANT CHANGE UP (ref 4.2–5.8)
RBC # FLD: 14.2 % — SIGNIFICANT CHANGE UP (ref 10.3–14.5)
SODIUM SERPL-SCNC: 135 MMOL/L — SIGNIFICANT CHANGE UP (ref 135–145)
WBC # BLD: 12.64 K/UL — HIGH (ref 3.8–10.5)
WBC # FLD AUTO: 12.64 K/UL — HIGH (ref 3.8–10.5)

## 2022-08-06 PROCEDURE — 74018 RADEX ABDOMEN 1 VIEW: CPT | Mod: 26

## 2022-08-06 RX ORDER — METOCLOPRAMIDE HCL 10 MG
10 TABLET ORAL EVERY 8 HOURS
Refills: 0 | Status: DISCONTINUED | OUTPATIENT
Start: 2022-08-06 | End: 2022-08-08

## 2022-08-06 RX ORDER — CYCLOBENZAPRINE HYDROCHLORIDE 10 MG/1
5 TABLET, FILM COATED ORAL EVERY 8 HOURS
Refills: 0 | Status: DISCONTINUED | OUTPATIENT
Start: 2022-08-06 | End: 2022-08-09

## 2022-08-06 RX ORDER — ONDANSETRON 8 MG/1
4 TABLET, FILM COATED ORAL EVERY 6 HOURS
Refills: 0 | Status: DISCONTINUED | OUTPATIENT
Start: 2022-08-06 | End: 2022-08-06

## 2022-08-06 RX ORDER — POTASSIUM CHLORIDE 20 MEQ
10 PACKET (EA) ORAL ONCE
Refills: 0 | Status: COMPLETED | OUTPATIENT
Start: 2022-08-06 | End: 2022-08-07

## 2022-08-06 RX ORDER — SODIUM CHLORIDE 9 MG/ML
1000 INJECTION, SOLUTION INTRAVENOUS
Refills: 0 | Status: DISCONTINUED | OUTPATIENT
Start: 2022-08-06 | End: 2022-08-08

## 2022-08-06 RX ORDER — SIMETHICONE 80 MG/1
80 TABLET, CHEWABLE ORAL ONCE
Refills: 0 | Status: COMPLETED | OUTPATIENT
Start: 2022-08-06 | End: 2022-08-06

## 2022-08-06 RX ORDER — TRAMADOL HYDROCHLORIDE 50 MG/1
25 TABLET ORAL EVERY 6 HOURS
Refills: 0 | Status: DISCONTINUED | OUTPATIENT
Start: 2022-08-06 | End: 2022-08-06

## 2022-08-06 RX ORDER — POTASSIUM CHLORIDE 20 MEQ
20 PACKET (EA) ORAL ONCE
Refills: 0 | Status: COMPLETED | OUTPATIENT
Start: 2022-08-06 | End: 2022-08-06

## 2022-08-06 RX ORDER — GABAPENTIN 400 MG/1
300 CAPSULE ORAL THREE TIMES A DAY
Refills: 0 | Status: DISCONTINUED | OUTPATIENT
Start: 2022-08-06 | End: 2022-08-09

## 2022-08-06 RX ORDER — POTASSIUM CHLORIDE 20 MEQ
40 PACKET (EA) ORAL ONCE
Refills: 0 | Status: COMPLETED | OUTPATIENT
Start: 2022-08-06 | End: 2022-08-06

## 2022-08-06 RX ORDER — IOHEXOL 300 MG/ML
30 INJECTION, SOLUTION INTRAVENOUS ONCE
Refills: 0 | Status: DISCONTINUED | OUTPATIENT
Start: 2022-08-06 | End: 2022-08-06

## 2022-08-06 RX ORDER — METOCLOPRAMIDE HCL 10 MG
10 TABLET ORAL EVERY 8 HOURS
Refills: 0 | Status: DISCONTINUED | OUTPATIENT
Start: 2022-08-06 | End: 2022-08-06

## 2022-08-06 RX ORDER — ONDANSETRON 8 MG/1
4 TABLET, FILM COATED ORAL EVERY 6 HOURS
Refills: 0 | Status: DISCONTINUED | OUTPATIENT
Start: 2022-08-06 | End: 2022-08-08

## 2022-08-06 RX ADMIN — Medication 10 MILLIGRAM(S): at 00:03

## 2022-08-06 RX ADMIN — Medication 6 MILLIGRAM(S): at 00:03

## 2022-08-06 RX ADMIN — Medication 100 GRAM(S): at 01:08

## 2022-08-06 RX ADMIN — Medication 20 MILLIEQUIVALENT(S): at 18:12

## 2022-08-06 RX ADMIN — CYCLOBENZAPRINE HYDROCHLORIDE 10 MILLIGRAM(S): 10 TABLET, FILM COATED ORAL at 00:41

## 2022-08-06 RX ADMIN — Medication 40 MILLIEQUIVALENT(S): at 15:51

## 2022-08-06 RX ADMIN — GABAPENTIN 300 MILLIGRAM(S): 400 CAPSULE ORAL at 13:25

## 2022-08-06 RX ADMIN — CYCLOBENZAPRINE HYDROCHLORIDE 5 MILLIGRAM(S): 10 TABLET, FILM COATED ORAL at 11:23

## 2022-08-06 RX ADMIN — TRAMADOL HYDROCHLORIDE 25 MILLIGRAM(S): 50 TABLET ORAL at 10:07

## 2022-08-06 RX ADMIN — Medication 975 MILLIGRAM(S): at 12:27

## 2022-08-06 RX ADMIN — Medication 975 MILLIGRAM(S): at 00:04

## 2022-08-06 RX ADMIN — Medication 10 MILLIGRAM(S): at 11:24

## 2022-08-06 RX ADMIN — Medication 975 MILLIGRAM(S): at 13:17

## 2022-08-06 RX ADMIN — ENOXAPARIN SODIUM 40 MILLIGRAM(S): 100 INJECTION SUBCUTANEOUS at 06:22

## 2022-08-06 RX ADMIN — Medication 975 MILLIGRAM(S): at 18:15

## 2022-08-06 RX ADMIN — MONTELUKAST 10 MILLIGRAM(S): 4 TABLET, CHEWABLE ORAL at 12:27

## 2022-08-06 RX ADMIN — SODIUM CHLORIDE 100 MILLILITER(S): 9 INJECTION, SOLUTION INTRAVENOUS at 19:02

## 2022-08-06 RX ADMIN — Medication 975 MILLIGRAM(S): at 19:09

## 2022-08-06 RX ADMIN — SIMETHICONE 80 MILLIGRAM(S): 80 TABLET, CHEWABLE ORAL at 15:51

## 2022-08-06 RX ADMIN — TRAMADOL HYDROCHLORIDE 25 MILLIGRAM(S): 50 TABLET ORAL at 11:00

## 2022-08-06 RX ADMIN — SIMETHICONE 80 MILLIGRAM(S): 80 TABLET, CHEWABLE ORAL at 04:52

## 2022-08-06 RX ADMIN — Medication 145 MILLIGRAM(S): at 12:27

## 2022-08-06 RX ADMIN — Medication 975 MILLIGRAM(S): at 06:23

## 2022-08-06 NOTE — CHART NOTE - NSCHARTNOTEFT_GEN_A_CORE
POST-OPERATIVE NOTE    POD #0 s/p Robotic-assisted cholecystectomy and laparoscopic liver wedge resection    Subjective: Pt seen in PACU in NAD, denied severe pain, had 1 episode of emesis while in recovery unit then nausea temporarily resolved before additional episode of emesis of the floor, now s/p zofran and reglan.    Vital Signs Last 24 Hrs  T(C): 37.2 (06 Aug 2022 00:28), Max: 37.2 (05 Aug 2022 17:00)  T(F): 98.9 (06 Aug 2022 00:28), Max: 99 (05 Aug 2022 17:00)  HR: 100 (06 Aug 2022 00:28) (82 - 104)  BP: 135/80 (06 Aug 2022 00:28) (129/81 - 150/95)  BP(mean): 108 (05 Aug 2022 20:00) (90 - 108)  RR: 18 (06 Aug 2022 00:28) (14 - 21)  SpO2: 97% (06 Aug 2022 00:28) (97% - 100%)    Parameters below as of 06 Aug 2022 00:28  Patient On (Oxygen Delivery Method): room air    I&O's Detail    05 Aug 2022 07:01  -  06 Aug 2022 01:33  --------------------------------------------------------  IN:    IV PiggyBack: 100 mL    Lactated Ringers: 225 mL  Total IN: 325 mL    OUT:    Ureteral Catheter (mL): 1200 mL  Total OUT: 1200 mL    Total NET: -875 mL      MEDICATIONS  enoxaparin Injectable 40    PAST MEDICAL & SURGICAL HISTORY:  Hypertension  Type 2 diabetes mellitus  Hypercholesteremia  Asthma, mild - no inhaler  Congenital heart disease, cardiac eval 1 year ago  No significant past surgical history    Physical Exam:  General: NAD, resting comfortably in bed  Pulmonary: Nonlabored breathing, no respiratory distress  Cardiovascular: NSR  Abdominal: soft, NT/ND, 5 port site incision c/d/i without strike-through  Extremities: WWP      LABS:                        12.8   10.88 )-----------( 226      ( 05 Aug 2022 16:06 )             38.4     08-05    139  |  102  |  14  ----------------------------<  150<H>  3.7   |  25  |  1.04    Ca    9.3      05 Aug 2022 16:06  Phos  2.9     08-05  Mg     1.80     08-05        CAPILLARY BLOOD GLUCOSE  POCT Blood Glucose.: 138 mg/dL (05 Aug 2022 23:02)  POCT Blood Glucose.: 158 mg/dL (05 Aug 2022 15:38)      Radiology and Additional Studies: No new imaging to review    A/P: 36M with PMH liver disease and cholesterolosis of gall bladder, now POD #0 s/p robotic-assisted cholecystectomy and laparoscopic liver wedge resection, postop course c/b PONV otherwise recovering appropriately with timeline.  - Advance diet as tolerated  - c/w hamm until AM  - Pain control with 975 tylenol ATC, flexeril PRN, avoid narcotic analgesia  - DVT ppx  - OOB and ambulating as tolerated  - F/u AM labs  - Possible AM discharge POST-OPERATIVE NOTE    POD #0 s/p Robotic-assisted cholecystectomy and laparoscopic liver wedge resection    Subjective: Pt seen in PACU in NAD, denied severe pain, had 1 episode of emesis while in recovery unit then nausea temporarily resolved before additional episode of emesis of the floor, now s/p zofran and reglan.    Vital Signs Last 24 Hrs  T(C): 37.2 (06 Aug 2022 00:28), Max: 37.2 (05 Aug 2022 17:00)  T(F): 98.9 (06 Aug 2022 00:28), Max: 99 (05 Aug 2022 17:00)  HR: 100 (06 Aug 2022 00:28) (82 - 104)  BP: 135/80 (06 Aug 2022 00:28) (129/81 - 150/95)  BP(mean): 108 (05 Aug 2022 20:00) (90 - 108)  RR: 18 (06 Aug 2022 00:28) (14 - 21)  SpO2: 97% (06 Aug 2022 00:28) (97% - 100%)    Parameters below as of 06 Aug 2022 00:28  Patient On (Oxygen Delivery Method): room air    I&O's Detail    05 Aug 2022 07:01  -  06 Aug 2022 01:33  --------------------------------------------------------  IN:    IV PiggyBack: 100 mL    Lactated Ringers: 225 mL  Total IN: 325 mL    OUT:    Ureteral Catheter (mL): 1200 mL  Total OUT: 1200 mL    Total NET: -875 mL      MEDICATIONS  enoxaparin Injectable 40    PAST MEDICAL & SURGICAL HISTORY:  Hypertension  Type 2 diabetes mellitus  Hypercholesteremia  Asthma, mild - no inhaler  Congenital heart disease, cardiac eval 1 year ago  No significant past surgical history    Physical Exam:  General: NAD, resting comfortably in bed  Pulmonary: Nonlabored breathing, no respiratory distress  Cardiovascular: NSR  Abdominal: soft, NT/ND, 5 port site incision c/d/i without strike-through  Extremities: WWP      LABS:                        12.8   10.88 )-----------( 226      ( 05 Aug 2022 16:06 )             38.4     08-05    139  |  102  |  14  ----------------------------<  150<H>  3.7   |  25  |  1.04    Ca    9.3      05 Aug 2022 16:06  Phos  2.9     08-05  Mg     1.80     08-05        CAPILLARY BLOOD GLUCOSE  POCT Blood Glucose.: 138 mg/dL (05 Aug 2022 23:02)  POCT Blood Glucose.: 158 mg/dL (05 Aug 2022 15:38)      Radiology and Additional Studies: No new imaging to review    A/P: 36M with PMH liver disease and cholesterolosis of gall bladder, now POD #0 s/p robotic-assisted cholecystectomy and laparoscopic liver wedge resection, c/b PONV otherwise recovering appropriately with postop timeline.  - Advance diet as tolerated  - c/w hamm until AM  - Pain control with 975 tylenol ATC, flexeril PRN, avoid narcotic analgesia  - DVT ppx  - OOB and ambulating as tolerated  - F/u AM labs  - Possible AM discharge

## 2022-08-06 NOTE — PROGRESS NOTE ADULT - ASSESSMENT
Assessment: 36 year old male with a PMH of HTN, DM, hypercholesterolemia, asthma, and congenital heart disease with unspecified liver disease and cholesterosis now s/p robotic cholecystectomy and wedge resection of liver on 8/5. Abdominal x-ray on 8/6 showed a distended stomach.     Plan:  - NPO  - Monitor for repeat vomiting  - Reglan, simethicone, zofran  - Flexaril, gabapentin  - D/C hansel [facility] Team  [id number]

## 2022-08-07 LAB
ALBUMIN SERPL ELPH-MCNC: 4.5 G/DL — SIGNIFICANT CHANGE UP (ref 3.3–5)
ALP SERPL-CCNC: 42 U/L — SIGNIFICANT CHANGE UP (ref 40–120)
ALT FLD-CCNC: 156 U/L — HIGH (ref 4–41)
ANION GAP SERPL CALC-SCNC: 10 MMOL/L — SIGNIFICANT CHANGE UP (ref 7–14)
AST SERPL-CCNC: 78 U/L — HIGH (ref 4–40)
BILIRUB SERPL-MCNC: 0.6 MG/DL — SIGNIFICANT CHANGE UP (ref 0.2–1.2)
BUN SERPL-MCNC: 12 MG/DL — SIGNIFICANT CHANGE UP (ref 7–23)
CALCIUM SERPL-MCNC: 9 MG/DL — SIGNIFICANT CHANGE UP (ref 8.4–10.5)
CHLORIDE SERPL-SCNC: 102 MMOL/L — SIGNIFICANT CHANGE UP (ref 98–107)
CO2 SERPL-SCNC: 25 MMOL/L — SIGNIFICANT CHANGE UP (ref 22–31)
CREAT SERPL-MCNC: 0.79 MG/DL — SIGNIFICANT CHANGE UP (ref 0.5–1.3)
EGFR: 117 ML/MIN/1.73M2 — SIGNIFICANT CHANGE UP
GLUCOSE BLDC GLUCOMTR-MCNC: 105 MG/DL — HIGH (ref 70–99)
GLUCOSE BLDC GLUCOMTR-MCNC: 121 MG/DL — HIGH (ref 70–99)
GLUCOSE BLDC GLUCOMTR-MCNC: 124 MG/DL — HIGH (ref 70–99)
GLUCOSE BLDC GLUCOMTR-MCNC: 129 MG/DL — HIGH (ref 70–99)
GLUCOSE SERPL-MCNC: 123 MG/DL — HIGH (ref 70–99)
HCT VFR BLD CALC: 38 % — LOW (ref 39–50)
HGB BLD-MCNC: 12.3 G/DL — LOW (ref 13–17)
INR BLD: 1.28 RATIO — HIGH (ref 0.88–1.16)
MAGNESIUM SERPL-MCNC: 2.5 MG/DL — SIGNIFICANT CHANGE UP (ref 1.6–2.6)
MCHC RBC-ENTMCNC: 25.3 PG — LOW (ref 27–34)
MCHC RBC-ENTMCNC: 32.4 GM/DL — SIGNIFICANT CHANGE UP (ref 32–36)
MCV RBC AUTO: 78.2 FL — LOW (ref 80–100)
MELD SCORE WITH DIALYSIS: 22 POINTS — SIGNIFICANT CHANGE UP
MELD SCORE WITHOUT DIALYSIS: 9 POINTS — SIGNIFICANT CHANGE UP
NRBC # BLD: 0 /100 WBCS — SIGNIFICANT CHANGE UP
NRBC # FLD: 0 K/UL — SIGNIFICANT CHANGE UP
PHOSPHATE SERPL-MCNC: 1.3 MG/DL — LOW (ref 2.5–4.5)
PLATELET # BLD AUTO: 222 K/UL — SIGNIFICANT CHANGE UP (ref 150–400)
POTASSIUM SERPL-MCNC: 4 MMOL/L — SIGNIFICANT CHANGE UP (ref 3.5–5.3)
POTASSIUM SERPL-SCNC: 4 MMOL/L — SIGNIFICANT CHANGE UP (ref 3.5–5.3)
PROT SERPL-MCNC: 7.4 G/DL — SIGNIFICANT CHANGE UP (ref 6–8.3)
PROTHROM AB SERPL-ACNC: 14.9 SEC — HIGH (ref 10.5–13.4)
RBC # BLD: 4.86 M/UL — SIGNIFICANT CHANGE UP (ref 4.2–5.8)
RBC # FLD: 14.6 % — HIGH (ref 10.3–14.5)
SODIUM SERPL-SCNC: 137 MMOL/L — SIGNIFICANT CHANGE UP (ref 135–145)
WBC # BLD: 11.75 K/UL — HIGH (ref 3.8–10.5)
WBC # FLD AUTO: 11.75 K/UL — HIGH (ref 3.8–10.5)

## 2022-08-07 RX ORDER — SIMETHICONE 80 MG/1
80 TABLET, CHEWABLE ORAL EVERY 6 HOURS
Refills: 0 | Status: DISCONTINUED | OUTPATIENT
Start: 2022-08-07 | End: 2022-08-09

## 2022-08-07 RX ADMIN — Medication 100 MILLIEQUIVALENT(S): at 00:27

## 2022-08-07 RX ADMIN — Medication 145 MILLIGRAM(S): at 12:55

## 2022-08-07 RX ADMIN — GABAPENTIN 300 MILLIGRAM(S): 400 CAPSULE ORAL at 00:05

## 2022-08-07 RX ADMIN — GABAPENTIN 300 MILLIGRAM(S): 400 CAPSULE ORAL at 12:55

## 2022-08-07 RX ADMIN — SIMETHICONE 80 MILLIGRAM(S): 80 TABLET, CHEWABLE ORAL at 22:14

## 2022-08-07 RX ADMIN — GABAPENTIN 300 MILLIGRAM(S): 400 CAPSULE ORAL at 22:15

## 2022-08-07 RX ADMIN — MONTELUKAST 10 MILLIGRAM(S): 4 TABLET, CHEWABLE ORAL at 12:56

## 2022-08-07 RX ADMIN — Medication 20 MILLIEQUIVALENT(S): at 00:02

## 2022-08-07 RX ADMIN — Medication 975 MILLIGRAM(S): at 17:14

## 2022-08-07 RX ADMIN — CYCLOBENZAPRINE HYDROCHLORIDE 5 MILLIGRAM(S): 10 TABLET, FILM COATED ORAL at 11:07

## 2022-08-07 RX ADMIN — SODIUM CHLORIDE 100 MILLILITER(S): 9 INJECTION, SOLUTION INTRAVENOUS at 11:03

## 2022-08-07 RX ADMIN — SIMETHICONE 80 MILLIGRAM(S): 80 TABLET, CHEWABLE ORAL at 06:20

## 2022-08-07 RX ADMIN — Medication 85 MILLIMOLE(S): at 11:03

## 2022-08-07 RX ADMIN — Medication 975 MILLIGRAM(S): at 06:21

## 2022-08-07 RX ADMIN — Medication 975 MILLIGRAM(S): at 06:52

## 2022-08-07 RX ADMIN — SIMETHICONE 80 MILLIGRAM(S): 80 TABLET, CHEWABLE ORAL at 12:57

## 2022-08-07 RX ADMIN — SODIUM CHLORIDE 100 MILLILITER(S): 9 INJECTION, SOLUTION INTRAVENOUS at 06:24

## 2022-08-07 RX ADMIN — GABAPENTIN 300 MILLIGRAM(S): 400 CAPSULE ORAL at 06:21

## 2022-08-07 RX ADMIN — Medication 975 MILLIGRAM(S): at 12:54

## 2022-08-07 RX ADMIN — Medication 975 MILLIGRAM(S): at 00:01

## 2022-08-07 RX ADMIN — CYCLOBENZAPRINE HYDROCHLORIDE 5 MILLIGRAM(S): 10 TABLET, FILM COATED ORAL at 00:03

## 2022-08-07 RX ADMIN — ENOXAPARIN SODIUM 40 MILLIGRAM(S): 100 INJECTION SUBCUTANEOUS at 06:20

## 2022-08-07 NOTE — PROGRESS NOTE ADULT - SUBJECTIVE AND OBJECTIVE BOX
OVERNIGHT: ZAYRA TEAM Surgery Progress Note  Patient is a 36y old  Male who presents with a chief complaint of " I am having surgery for the liver and gall bladder ". (01 Aug 2022 18:37)      INTERVAL EVENTS: Patient passed TOV  SUBJECTIVE: Patient seen and examined at bedside with surgical team, patient without complaints. Denies fever, chills, CP, vomiting, abdominal pain. Endorses Bm and gas. Complaining of gas pain and nausea.    REVIEW OF SYSTEMS:  Constitutional: No fevers or chills. No malaise or weakness.  EENT: No vision changes. No ear pain. No nasal congestion or rhinitis. No throat pain or dysphagia.  Respiratory: No cough, wheezing, or SOB. No hemoptysis.  Cardiovascular: No chest pain or palpitations.  Gastrointestinal: No abdominal pain. No nausea, vomiting, diarrhea or constipation. No hematochezia. No melena.  Genitourinary: No dysuria, hematuria, or frequency.  Neurologic: No numbness or tingling. No weakness.  Skin: No rashes or pruritus.     OBJECTIVE:    Vital Signs Last 24 Hrs  T(C): 37.4 (07 Aug 2022 16:05), Max: 37.4 (07 Aug 2022 00:28)  T(F): 99.4 (07 Aug 2022 16:05), Max: 99.4 (07 Aug 2022 16:05)  HR: 85 (07 Aug 2022 16:05) (76 - 94)  BP: 114/73 (07 Aug 2022 16:05) (100/75 - 131/87)  BP(mean): --  RR: 17 (07 Aug 2022 16:05) (16 - 18)  SpO2: 99% (07 Aug 2022 16:05) (97% - 100%)    Parameters below as of 07 Aug 2022 16:05  Patient On (Oxygen Delivery Method): room air    I&O's Detail    06 Aug 2022 07:01  -  07 Aug 2022 07:00  --------------------------------------------------------  IN:    dextrose 5% + sodium chloride 0.45%: 400 mL    Lactated Ringers: 525 mL    Oral Fluid: 360 mL  Total IN: 1285 mL    OUT:    Emesis (mL): 0 mL    Stool (mL): 0 mL    Ureteral Catheter (mL): 1775 mL    Voided (mL): 1100 mL  Total OUT: 2875 mL    Total NET: -1590 mL      07 Aug 2022 07:01  -  07 Aug 2022 19:27  --------------------------------------------------------  IN:    dextrose 5% + sodium chloride 0.45%: 600 mL    Oral Fluid: 240 mL  Total IN: 840 mL    OUT:    Voided (mL): 700 mL  Total OUT: 700 mL    Total NET: 140 mL      MEDICATIONS  (STANDING):  acetaminophen     Tablet .. 975 milliGRAM(s) Oral every 6 hours  dextrose 5% + sodium chloride 0.45%. 1000 milliLiter(s) (50 mL/Hr) IV Continuous <Continuous>  dextrose 5%. 1000 milliLiter(s) (50 mL/Hr) IV Continuous <Continuous>  dextrose 5%. 1000 milliLiter(s) (100 mL/Hr) IV Continuous <Continuous>  dextrose 50% Injectable 25 Gram(s) IV Push once  dextrose 50% Injectable 12.5 Gram(s) IV Push once  dextrose 50% Injectable 25 Gram(s) IV Push once  enoxaparin Injectable 40 milliGRAM(s) SubCutaneous every 24 hours  fenofibrate Tablet 145 milliGRAM(s) Oral daily  gabapentin 300 milliGRAM(s) Oral three times a day  glucagon  Injectable 1 milliGRAM(s) IntraMuscular once  insulin lispro (ADMELOG) corrective regimen sliding scale   SubCutaneous three times a day before meals  insulin lispro (ADMELOG) corrective regimen sliding scale   SubCutaneous at bedtime  montelukast 10 milliGRAM(s) Oral daily    MEDICATIONS  (PRN):  cyclobenzaprine 5 milliGRAM(s) Oral every 8 hours PRN Muscle Spasm  dextrose Oral Gel 15 Gram(s) Oral once PRN Blood Glucose LESS THAN 70 milliGRAM(s)/deciliter  metoclopramide Injectable 10 milliGRAM(s) IV Push every 8 hours PRN nausea/vomiting  ondansetron Injectable 4 milliGRAM(s) IV Push every 6 hours PRN Nausea and/or Vomiting  simethicone 80 milliGRAM(s) Chew every 6 hours PRN Gas      PHYSICAL EXAM:  Constitutional: A&Ox3, NAD  Respiratory: Unlabored breathing  Abdomen: Soft, nondistended, NTTP. No rebound or guarding.  Extremities: WWP, ALDRIDGE spontaneously    LABS:                        12.3   11.75 )-----------( 222      ( 07 Aug 2022 05:42 )             38.0     08-07    137  |  102  |  12  ----------------------------<  123<H>  4.0   |  25  |  0.79    Ca    9.0      07 Aug 2022 05:42  Phos  1.3     08-07  Mg     2.50     08-07    TPro  7.4  /  Alb  4.5  /  TBili  0.6  /  DBili  x   /  AST  78<H>  /  ALT  156<H>  /  AlkPhos  42  08-07    PT/INR - ( 07 Aug 2022 05:42 )   PT: 14.9 sec;   INR: 1.28 ratio           LIVER FUNCTIONS - ( 07 Aug 2022 05:42 )  Alb: 4.5 g/dL / Pro: 7.4 g/dL / ALK PHOS: 42 U/L / ALT: 156 U/L / AST: 78 U/L / GGT: x                 IMAGING:

## 2022-08-07 NOTE — PROGRESS NOTE ADULT - ASSESSMENT
Assessment: 36 year old male with a PMH of HTN, DM, hypercholesterolemia, asthma, and congenital heart disease with unspecified liver disease and cholesterosis now s/p robotic cholecystectomy and wedge resection of liver on 8/5. Abdominal x-ray on 8/6 showed a distended stomach.     Plan:  - NPO  - Monitor for repeat vomiting  - Reglan, simethicone, zofran  - Flexaril, gabapentin    D Team  [id number] Assessment: 36 year old male with a PMH of HTN, DM, hypercholesterolemia, asthma, and congenital heart disease with unspecified liver disease and cholesterosis now s/p robotic cholecystectomy and wedge resection of liver on 8/5. Abdominal x-ray on 8/6 showed a distended stomach.     Plan:  - Regular diet  - Monitor for repeat vomiting  - Reglan, simethicone, zofran  - Flexaril, gabapentin    D Team  [id number]

## 2022-08-08 ENCOUNTER — TRANSCRIPTION ENCOUNTER (OUTPATIENT)
Age: 37
End: 2022-08-08

## 2022-08-08 LAB
ALBUMIN SERPL ELPH-MCNC: 3.7 G/DL — SIGNIFICANT CHANGE UP (ref 3.3–5)
ALP SERPL-CCNC: 40 U/L — SIGNIFICANT CHANGE UP (ref 40–120)
ALT FLD-CCNC: 96 U/L — HIGH (ref 4–41)
ANION GAP SERPL CALC-SCNC: 11 MMOL/L — SIGNIFICANT CHANGE UP (ref 7–14)
ANION GAP SERPL CALC-SCNC: 8 MMOL/L — SIGNIFICANT CHANGE UP (ref 7–14)
AST SERPL-CCNC: 33 U/L — SIGNIFICANT CHANGE UP (ref 4–40)
BILIRUB SERPL-MCNC: 0.4 MG/DL — SIGNIFICANT CHANGE UP (ref 0.2–1.2)
BUN SERPL-MCNC: 10 MG/DL — SIGNIFICANT CHANGE UP (ref 7–23)
BUN SERPL-MCNC: 8 MG/DL — SIGNIFICANT CHANGE UP (ref 7–23)
CALCIUM SERPL-MCNC: 8 MG/DL — LOW (ref 8.4–10.5)
CALCIUM SERPL-MCNC: 9.5 MG/DL — SIGNIFICANT CHANGE UP (ref 8.4–10.5)
CHLORIDE SERPL-SCNC: 98 MMOL/L — SIGNIFICANT CHANGE UP (ref 98–107)
CHLORIDE SERPL-SCNC: 99 MMOL/L — SIGNIFICANT CHANGE UP (ref 98–107)
CO2 SERPL-SCNC: 23 MMOL/L — SIGNIFICANT CHANGE UP (ref 22–31)
CO2 SERPL-SCNC: 26 MMOL/L — SIGNIFICANT CHANGE UP (ref 22–31)
CREAT SERPL-MCNC: 0.72 MG/DL — SIGNIFICANT CHANGE UP (ref 0.5–1.3)
CREAT SERPL-MCNC: 0.85 MG/DL — SIGNIFICANT CHANGE UP (ref 0.5–1.3)
EGFR: 114 ML/MIN/1.73M2 — SIGNIFICANT CHANGE UP
EGFR: 120 ML/MIN/1.73M2 — SIGNIFICANT CHANGE UP
GLUCOSE BLDC GLUCOMTR-MCNC: 108 MG/DL — HIGH (ref 70–99)
GLUCOSE BLDC GLUCOMTR-MCNC: 113 MG/DL — HIGH (ref 70–99)
GLUCOSE BLDC GLUCOMTR-MCNC: 125 MG/DL — HIGH (ref 70–99)
GLUCOSE BLDC GLUCOMTR-MCNC: 127 MG/DL — HIGH (ref 70–99)
GLUCOSE BLDC GLUCOMTR-MCNC: 94 MG/DL — SIGNIFICANT CHANGE UP (ref 70–99)
GLUCOSE SERPL-MCNC: 118 MG/DL — HIGH (ref 70–99)
GLUCOSE SERPL-MCNC: 557 MG/DL — CRITICAL HIGH (ref 70–99)
HCT VFR BLD CALC: 35.2 % — LOW (ref 39–50)
HGB BLD-MCNC: 10.9 G/DL — LOW (ref 13–17)
MAGNESIUM SERPL-MCNC: 2.2 MG/DL — SIGNIFICANT CHANGE UP (ref 1.6–2.6)
MAGNESIUM SERPL-MCNC: 2.3 MG/DL — SIGNIFICANT CHANGE UP (ref 1.6–2.6)
MCHC RBC-ENTMCNC: 24.8 PG — LOW (ref 27–34)
MCHC RBC-ENTMCNC: 31 GM/DL — LOW (ref 32–36)
MCV RBC AUTO: 80 FL — SIGNIFICANT CHANGE UP (ref 80–100)
NRBC # BLD: 0 /100 WBCS — SIGNIFICANT CHANGE UP
NRBC # FLD: 0 K/UL — SIGNIFICANT CHANGE UP
PHOSPHATE SERPL-MCNC: 1.6 MG/DL — LOW (ref 2.5–4.5)
PHOSPHATE SERPL-MCNC: 2.3 MG/DL — LOW (ref 2.5–4.5)
PLATELET # BLD AUTO: 197 K/UL — SIGNIFICANT CHANGE UP (ref 150–400)
POTASSIUM SERPL-MCNC: 3.2 MMOL/L — LOW (ref 3.5–5.3)
POTASSIUM SERPL-MCNC: 3.4 MMOL/L — LOW (ref 3.5–5.3)
POTASSIUM SERPL-SCNC: 3.2 MMOL/L — LOW (ref 3.5–5.3)
POTASSIUM SERPL-SCNC: 3.4 MMOL/L — LOW (ref 3.5–5.3)
PROT SERPL-MCNC: 6.3 G/DL — SIGNIFICANT CHANGE UP (ref 6–8.3)
RBC # BLD: 4.4 M/UL — SIGNIFICANT CHANGE UP (ref 4.2–5.8)
RBC # FLD: 14.3 % — SIGNIFICANT CHANGE UP (ref 10.3–14.5)
SODIUM SERPL-SCNC: 129 MMOL/L — LOW (ref 135–145)
SODIUM SERPL-SCNC: 136 MMOL/L — SIGNIFICANT CHANGE UP (ref 135–145)
WBC # BLD: 8.26 K/UL — SIGNIFICANT CHANGE UP (ref 3.8–10.5)
WBC # FLD AUTO: 8.26 K/UL — SIGNIFICANT CHANGE UP (ref 3.8–10.5)

## 2022-08-08 RX ORDER — GABAPENTIN 400 MG/1
1 CAPSULE ORAL
Qty: 15 | Refills: 0
Start: 2022-08-08 | End: 2022-08-12

## 2022-08-08 RX ORDER — CYCLOBENZAPRINE HYDROCHLORIDE 10 MG/1
1 TABLET, FILM COATED ORAL
Qty: 15 | Refills: 0
Start: 2022-08-08 | End: 2022-08-12

## 2022-08-08 RX ORDER — KETOROLAC TROMETHAMINE 30 MG/ML
15 SYRINGE (ML) INJECTION EVERY 6 HOURS
Refills: 0 | Status: COMPLETED | OUTPATIENT
Start: 2022-08-08 | End: 2022-08-09

## 2022-08-08 RX ORDER — LANOLIN ALCOHOL/MO/W.PET/CERES
6 CREAM (GRAM) TOPICAL AT BEDTIME
Refills: 0 | Status: DISCONTINUED | OUTPATIENT
Start: 2022-08-08 | End: 2022-08-09

## 2022-08-08 RX ORDER — SODIUM,POTASSIUM PHOSPHATES 278-250MG
1 POWDER IN PACKET (EA) ORAL EVERY 4 HOURS
Refills: 0 | Status: COMPLETED | OUTPATIENT
Start: 2022-08-08 | End: 2022-08-08

## 2022-08-08 RX ADMIN — Medication 975 MILLIGRAM(S): at 17:40

## 2022-08-08 RX ADMIN — GABAPENTIN 300 MILLIGRAM(S): 400 CAPSULE ORAL at 23:01

## 2022-08-08 RX ADMIN — Medication 975 MILLIGRAM(S): at 01:22

## 2022-08-08 RX ADMIN — Medication 975 MILLIGRAM(S): at 14:25

## 2022-08-08 RX ADMIN — Medication 1 TABLET(S): at 17:40

## 2022-08-08 RX ADMIN — MONTELUKAST 10 MILLIGRAM(S): 4 TABLET, CHEWABLE ORAL at 13:27

## 2022-08-08 RX ADMIN — Medication 975 MILLIGRAM(S): at 13:25

## 2022-08-08 RX ADMIN — Medication 975 MILLIGRAM(S): at 00:43

## 2022-08-08 RX ADMIN — Medication 15 MILLIGRAM(S): at 11:01

## 2022-08-08 RX ADMIN — GABAPENTIN 300 MILLIGRAM(S): 400 CAPSULE ORAL at 13:26

## 2022-08-08 RX ADMIN — Medication 6 MILLIGRAM(S): at 03:05

## 2022-08-08 RX ADMIN — Medication 15 MILLIGRAM(S): at 17:40

## 2022-08-08 RX ADMIN — Medication 975 MILLIGRAM(S): at 06:51

## 2022-08-08 RX ADMIN — Medication 15 MILLIGRAM(S): at 10:01

## 2022-08-08 RX ADMIN — GABAPENTIN 300 MILLIGRAM(S): 400 CAPSULE ORAL at 06:52

## 2022-08-08 RX ADMIN — Medication 975 MILLIGRAM(S): at 07:31

## 2022-08-08 RX ADMIN — Medication 145 MILLIGRAM(S): at 13:26

## 2022-08-08 RX ADMIN — ENOXAPARIN SODIUM 40 MILLIGRAM(S): 100 INJECTION SUBCUTANEOUS at 06:52

## 2022-08-08 RX ADMIN — Medication 15 MILLIGRAM(S): at 23:02

## 2022-08-08 RX ADMIN — Medication 1 TABLET(S): at 13:25

## 2022-08-08 NOTE — PROGRESS NOTE ADULT - SUBJECTIVE AND OBJECTIVE BOX
TEAM Surgery Progress Note  Patient is a 36y old  Male who presents with a chief complaint of " I am having surgery for the liver and gall bladder ". (01 Aug 2022 18:37)      INTERVAL EVENTS: Patient passed TOV  SUBJECTIVE: Patient seen and examined at bedside with surgical team, patient without complaints. Denies fever, chills, CP, vomiting, abdominal pain. Endorses Bm and gas. Complaining of gas pain and nausea.    REVIEW OF SYSTEMS:  Constitutional: No fevers or chills. No malaise or weakness.  EENT: No vision changes. No ear pain. No nasal congestion or rhinitis. No throat pain or dysphagia.  Respiratory: No cough, wheezing, or SOB. No hemoptysis.  Cardiovascular: No chest pain or palpitations.  Gastrointestinal: No abdominal pain. No nausea, vomiting, diarrhea or constipation. No hematochezia. No melena.  Genitourinary: No dysuria, hematuria, or frequency.  Neurologic: No numbness or tingling. No weakness.  Skin: No rashes or pruritus.     OBJECTIVE:    Vital Signs Last 24 Hrs  T(C): 37.4 (07 Aug 2022 16:05), Max: 37.4 (07 Aug 2022 00:28)  T(F): 99.4 (07 Aug 2022 16:05), Max: 99.4 (07 Aug 2022 16:05)  HR: 85 (07 Aug 2022 16:05) (76 - 94)  BP: 114/73 (07 Aug 2022 16:05) (100/75 - 131/87)  BP(mean): --  RR: 17 (07 Aug 2022 16:05) (16 - 18)  SpO2: 99% (07 Aug 2022 16:05) (97% - 100%)    Parameters below as of 07 Aug 2022 16:05  Patient On (Oxygen Delivery Method): room air    I&O's Detail    06 Aug 2022 07:01  -  07 Aug 2022 07:00  --------------------------------------------------------  IN:    dextrose 5% + sodium chloride 0.45%: 400 mL    Lactated Ringers: 525 mL    Oral Fluid: 360 mL  Total IN: 1285 mL    OUT:    Emesis (mL): 0 mL    Stool (mL): 0 mL    Ureteral Catheter (mL): 1775 mL    Voided (mL): 1100 mL  Total OUT: 2875 mL    Total NET: -1590 mL      07 Aug 2022 07:01  -  07 Aug 2022 19:27  --------------------------------------------------------  IN:    dextrose 5% + sodium chloride 0.45%: 600 mL    Oral Fluid: 240 mL  Total IN: 840 mL    OUT:    Voided (mL): 700 mL  Total OUT: 700 mL    Total NET: 140 mL      MEDICATIONS  (STANDING):  acetaminophen     Tablet .. 975 milliGRAM(s) Oral every 6 hours  dextrose 5% + sodium chloride 0.45%. 1000 milliLiter(s) (50 mL/Hr) IV Continuous <Continuous>  dextrose 5%. 1000 milliLiter(s) (50 mL/Hr) IV Continuous <Continuous>  dextrose 5%. 1000 milliLiter(s) (100 mL/Hr) IV Continuous <Continuous>  dextrose 50% Injectable 25 Gram(s) IV Push once  dextrose 50% Injectable 12.5 Gram(s) IV Push once  dextrose 50% Injectable 25 Gram(s) IV Push once  enoxaparin Injectable 40 milliGRAM(s) SubCutaneous every 24 hours  fenofibrate Tablet 145 milliGRAM(s) Oral daily  gabapentin 300 milliGRAM(s) Oral three times a day  glucagon  Injectable 1 milliGRAM(s) IntraMuscular once  insulin lispro (ADMELOG) corrective regimen sliding scale   SubCutaneous three times a day before meals  insulin lispro (ADMELOG) corrective regimen sliding scale   SubCutaneous at bedtime  montelukast 10 milliGRAM(s) Oral daily    MEDICATIONS  (PRN):  cyclobenzaprine 5 milliGRAM(s) Oral every 8 hours PRN Muscle Spasm  dextrose Oral Gel 15 Gram(s) Oral once PRN Blood Glucose LESS THAN 70 milliGRAM(s)/deciliter  metoclopramide Injectable 10 milliGRAM(s) IV Push every 8 hours PRN nausea/vomiting  ondansetron Injectable 4 milliGRAM(s) IV Push every 6 hours PRN Nausea and/or Vomiting  simethicone 80 milliGRAM(s) Chew every 6 hours PRN Gas      PHYSICAL EXAM:  Constitutional: A&Ox3, NAD  Respiratory: Unlabored breathing  Abdomen: Soft, nondistended, NTTP. No rebound or guarding.  Extremities: WWP, ALDRIDGE spontaneously    LABS:                        12.3   11.75 )-----------( 222      ( 07 Aug 2022 05:42 )             38.0     08-07    137  |  102  |  12  ----------------------------<  123<H>  4.0   |  25  |  0.79    Ca    9.0      07 Aug 2022 05:42  Phos  1.3     08-07  Mg     2.50     08-07    TPro  7.4  /  Alb  4.5  /  TBili  0.6  /  DBili  x   /  AST  78<H>  /  ALT  156<H>  /  AlkPhos  42  08-07    PT/INR - ( 07 Aug 2022 05:42 )   PT: 14.9 sec;   INR: 1.28 ratio           LIVER FUNCTIONS - ( 07 Aug 2022 05:42 )  Alb: 4.5 g/dL / Pro: 7.4 g/dL / ALK PHOS: 42 U/L / ALT: 156 U/L / AST: 78 U/L / GGT: x                 IMAGING:     D TEAM Surgery Progress Note  Patient is a 36y old  Male who presents with a chief complaint of " I am having surgery for the liver and gall bladder ". (01 Aug 2022 18:37)      INTERVAL EVENTS: Patient is POD#3 s/p robotic cholecystectomy, wedge resection of liver. No acute events overnight.     SUBJECTIVE: Patient seen and examined at bedside with surgical team, patient c/o gas pain. He reports belching, denies passing flatus. Denies abdominal pain from surgical incisions, only from gas. Tolerating regular diet without increased pain or nausea or vomiting. Denies fever, chills, CP, SOB. Denies BM.    OBJECTIVE:    Vital Signs Last 24 Hrs  T(C): 37.4 (07 Aug 2022 16:05), Max: 37.4 (07 Aug 2022 00:28)  T(F): 99.4 (07 Aug 2022 16:05), Max: 99.4 (07 Aug 2022 16:05)  HR: 85 (07 Aug 2022 16:05) (76 - 94)  BP: 114/73 (07 Aug 2022 16:05) (100/75 - 131/87)  BP(mean): --  RR: 17 (07 Aug 2022 16:05) (16 - 18)  SpO2: 99% (07 Aug 2022 16:05) (97% - 100%)    Parameters below as of 07 Aug 2022 16:05  Patient On (Oxygen Delivery Method): room air    I&O's Detail    06 Aug 2022 07:01  -  07 Aug 2022 07:00  --------------------------------------------------------  IN:    dextrose 5% + sodium chloride 0.45%: 400 mL    Lactated Ringers: 525 mL    Oral Fluid: 360 mL  Total IN: 1285 mL    OUT:    Emesis (mL): 0 mL    Stool (mL): 0 mL    Ureteral Catheter (mL): 1775 mL    Voided (mL): 1100 mL  Total OUT: 2875 mL    Total NET: -1590 mL      07 Aug 2022 07:01  -  07 Aug 2022 19:27  --------------------------------------------------------  IN:    dextrose 5% + sodium chloride 0.45%: 600 mL    Oral Fluid: 240 mL  Total IN: 840 mL    OUT:    Voided (mL): 700 mL  Total OUT: 700 mL    Total NET: 140 mL      MEDICATIONS  (STANDING):  acetaminophen     Tablet .. 975 milliGRAM(s) Oral every 6 hours  dextrose 5% + sodium chloride 0.45%. 1000 milliLiter(s) (50 mL/Hr) IV Continuous <Continuous>  dextrose 5%. 1000 milliLiter(s) (50 mL/Hr) IV Continuous <Continuous>  dextrose 5%. 1000 milliLiter(s) (100 mL/Hr) IV Continuous <Continuous>  dextrose 50% Injectable 25 Gram(s) IV Push once  dextrose 50% Injectable 12.5 Gram(s) IV Push once  dextrose 50% Injectable 25 Gram(s) IV Push once  enoxaparin Injectable 40 milliGRAM(s) SubCutaneous every 24 hours  fenofibrate Tablet 145 milliGRAM(s) Oral daily  gabapentin 300 milliGRAM(s) Oral three times a day  glucagon  Injectable 1 milliGRAM(s) IntraMuscular once  insulin lispro (ADMELOG) corrective regimen sliding scale   SubCutaneous three times a day before meals  insulin lispro (ADMELOG) corrective regimen sliding scale   SubCutaneous at bedtime  montelukast 10 milliGRAM(s) Oral daily    MEDICATIONS  (PRN):  cyclobenzaprine 5 milliGRAM(s) Oral every 8 hours PRN Muscle Spasm  dextrose Oral Gel 15 Gram(s) Oral once PRN Blood Glucose LESS THAN 70 milliGRAM(s)/deciliter  metoclopramide Injectable 10 milliGRAM(s) IV Push every 8 hours PRN nausea/vomiting  ondansetron Injectable 4 milliGRAM(s) IV Push every 6 hours PRN Nausea and/or Vomiting  simethicone 80 milliGRAM(s) Chew every 6 hours PRN Gas      PHYSICAL EXAM:  Constitutional: A&Ox3, NAD  Respiratory: Unlabored breathing  Abdomen: Soft, nondistended, NTTP. No rebound or guarding. Incisions C/D/I.  Extremities: WWP, ALDRIDGE spontaneously    LABS:                                   10.9   8.26  )-----------( 197      ( 08 Aug 2022 06:42 )             35.2   08-08    136  |  99  |  10  ----------------------------<  118<H>  3.4<L>   |  26  |  0.85    Ca    9.5      08 Aug 2022 10:45  Phos  2.3     08-08  Mg     2.30     08-08    TPro  6.3  /  Alb  3.7  /  TBili  0.4  /  DBili  x   /  AST  33  /  ALT  96<H>  /  AlkPhos  40  08-08

## 2022-08-08 NOTE — PROGRESS NOTE ADULT - ASSESSMENT
Assessment: 36 year old male with a PMH of HTN, DM, hypercholesterolemia, asthma, and congenital heart disease with unspecified liver disease and cholesterosis now s/p robotic cholecystectomy and wedge resection of liver on 8/5. Abdominal x-ray on 8/6 showed a distended stomach.     Plan:  - Regular diet  - Monitor for repeat vomiting  - Reglan, simethicone, zofran  - Flexaril, gabapentin    D Team  [id number] Assessment: 36 year old male with a PMH of HTN, DM, hypercholesterolemia, asthma, and congenital heart disease with unspecified liver disease and cholesterosis now s/p robotic cholecystectomy and wedge resection of liver on 8/5.     Plan:  - Pain control: Tylenol, Toradol, Flexeril  - Continue regular diet, patient educated on how to self regulate  - Continue simethicone, dulcolox suppository  - VTE ppx: Lovenox  - Dispo planning: Home without needs    D Team  [id number]

## 2022-08-09 ENCOUNTER — TRANSCRIPTION ENCOUNTER (OUTPATIENT)
Age: 37
End: 2022-08-09

## 2022-08-09 VITALS
SYSTOLIC BLOOD PRESSURE: 133 MMHG | TEMPERATURE: 98 F | DIASTOLIC BLOOD PRESSURE: 85 MMHG | RESPIRATION RATE: 17 BRPM | HEART RATE: 80 BPM | OXYGEN SATURATION: 99 %

## 2022-08-09 LAB
ANION GAP SERPL CALC-SCNC: 10 MMOL/L — SIGNIFICANT CHANGE UP (ref 7–14)
BUN SERPL-MCNC: 12 MG/DL — SIGNIFICANT CHANGE UP (ref 7–23)
CALCIUM SERPL-MCNC: 9.5 MG/DL — SIGNIFICANT CHANGE UP (ref 8.4–10.5)
CHLORIDE SERPL-SCNC: 103 MMOL/L — SIGNIFICANT CHANGE UP (ref 98–107)
CO2 SERPL-SCNC: 24 MMOL/L — SIGNIFICANT CHANGE UP (ref 22–31)
CREAT SERPL-MCNC: 0.94 MG/DL — SIGNIFICANT CHANGE UP (ref 0.5–1.3)
EGFR: 106 ML/MIN/1.73M2 — SIGNIFICANT CHANGE UP
GLUCOSE BLDC GLUCOMTR-MCNC: 101 MG/DL — HIGH (ref 70–99)
GLUCOSE SERPL-MCNC: 116 MG/DL — HIGH (ref 70–99)
HCT VFR BLD CALC: 37.8 % — LOW (ref 39–50)
HGB BLD-MCNC: 12 G/DL — LOW (ref 13–17)
MAGNESIUM SERPL-MCNC: 2.2 MG/DL — SIGNIFICANT CHANGE UP (ref 1.6–2.6)
MCHC RBC-ENTMCNC: 24.6 PG — LOW (ref 27–34)
MCHC RBC-ENTMCNC: 31.7 GM/DL — LOW (ref 32–36)
MCV RBC AUTO: 77.5 FL — LOW (ref 80–100)
NRBC # BLD: 0 /100 WBCS — SIGNIFICANT CHANGE UP
NRBC # FLD: 0 K/UL — SIGNIFICANT CHANGE UP
PHOSPHATE SERPL-MCNC: 2.9 MG/DL — SIGNIFICANT CHANGE UP (ref 2.5–4.5)
PLATELET # BLD AUTO: 262 K/UL — SIGNIFICANT CHANGE UP (ref 150–400)
POTASSIUM SERPL-MCNC: 3.8 MMOL/L — SIGNIFICANT CHANGE UP (ref 3.5–5.3)
POTASSIUM SERPL-SCNC: 3.8 MMOL/L — SIGNIFICANT CHANGE UP (ref 3.5–5.3)
RBC # BLD: 4.88 M/UL — SIGNIFICANT CHANGE UP (ref 4.2–5.8)
RBC # FLD: 14.1 % — SIGNIFICANT CHANGE UP (ref 10.3–14.5)
SODIUM SERPL-SCNC: 137 MMOL/L — SIGNIFICANT CHANGE UP (ref 135–145)
WBC # BLD: 7.45 K/UL — SIGNIFICANT CHANGE UP (ref 3.8–10.5)
WBC # FLD AUTO: 7.45 K/UL — SIGNIFICANT CHANGE UP (ref 3.8–10.5)

## 2022-08-09 RX ORDER — OXYCODONE HYDROCHLORIDE 5 MG/1
1 TABLET ORAL
Qty: 12 | Refills: 0
Start: 2022-08-09

## 2022-08-09 RX ORDER — CYCLOBENZAPRINE HYDROCHLORIDE 10 MG/1
1 TABLET, FILM COATED ORAL
Qty: 15 | Refills: 0
Start: 2022-08-09 | End: 2022-08-13

## 2022-08-09 RX ORDER — GABAPENTIN 400 MG/1
1 CAPSULE ORAL
Qty: 15 | Refills: 0
Start: 2022-08-09 | End: 2022-08-13

## 2022-08-09 RX ORDER — ACETAMINOPHEN 500 MG
2 TABLET ORAL
Qty: 0 | Refills: 0 | DISCHARGE

## 2022-08-09 RX ADMIN — Medication 975 MILLIGRAM(S): at 07:56

## 2022-08-09 RX ADMIN — Medication 975 MILLIGRAM(S): at 05:36

## 2022-08-09 RX ADMIN — Medication 15 MILLIGRAM(S): at 11:43

## 2022-08-09 RX ADMIN — GABAPENTIN 300 MILLIGRAM(S): 400 CAPSULE ORAL at 05:36

## 2022-08-09 RX ADMIN — Medication 975 MILLIGRAM(S): at 11:41

## 2022-08-09 RX ADMIN — ENOXAPARIN SODIUM 40 MILLIGRAM(S): 100 INJECTION SUBCUTANEOUS at 05:38

## 2022-08-09 RX ADMIN — Medication 15 MILLIGRAM(S): at 00:22

## 2022-08-09 NOTE — PROGRESS NOTE ADULT - ASSESSMENT
Patient is a 36 year old male with a PMHx of HTN, DM2, HLD, asthma and congenital heart disease who presented to pre-surgical testing with diagnosis of cholesterolosis of gallbladder.  Patient is now S/P Robot-assisted cholecystectomy and Robot-assisted laparoscopic wedge resection of liver segment 3 on 8/5/22.      PLAN:  - Regular diet  - Continue home medications  - Out of bed  - Pain control  - VTE prophylaxis with Lovenox subcutaneous  - Discharge planning home later today      #[id number]  D Team Surgery

## 2022-08-09 NOTE — DISCHARGE NOTE NURSING/CASE MANAGEMENT/SOCIAL WORK - PATIENT PORTAL LINK FT
You can access the FollowMyHealth Patient Portal offered by [facility] by registering at the following website: [email or web address]. By joining [facility]’s FollowMyHealth portal, you will also be able to view your health information using other applications (apps) compatible with our system.

## 2022-08-09 NOTE — PROGRESS NOTE ADULT - SUBJECTIVE AND OBJECTIVE BOX
Surgery Daily Progress Note  =====================================================  Interval / Overnight Events: No acute events overnight.      HPI:  Patient is a 36 year old male with a PMHx of HTN, DM2, HLD, asthma and congenital heart disease who presented to pre-surgical testing with diagnosis of cholesterolosis of gallbladder. (01 Aug 2022 18:37)      PAST MEDICAL & SURGICAL HISTORY:  Hypertension  Type 2 diabetes mellitus  Hypercholesteremia  Asthma  mild - no inhaler  Congenital heart disease  cardiac eval 1 year ago  No significant past surgical history        ALLERGIES:  No Known Allergies    --------------------------------------------------------------------------------------    MEDICATIONS:    Neurologic Medications  acetaminophen     Tablet .. 975 milliGRAM(s) Oral every 6 hours  cyclobenzaprine 5 milliGRAM(s) Oral every 8 hours PRN Muscle Spasm  gabapentin 300 milliGRAM(s) Oral three times a day  ketorolac   Injectable 15 milliGRAM(s) IV Push every 6 hours  melatonin 6 milliGRAM(s) Oral at bedtime PRN Insomnia    Respiratory Medications  montelukast 10 milliGRAM(s) Oral daily    Gastrointestinal Medications  bisacodyl Suppository 10 milliGRAM(s) Rectal daily PRN Constipation  dextrose 5%. 1000 milliLiter(s) IV Continuous <Continuous>  dextrose 5%. 1000 milliLiter(s) IV Continuous <Continuous>  simethicone 80 milliGRAM(s) Chew every 6 hours PRN Gas    Hematologic/Oncologic Medications  enoxaparin Injectable 40 milliGRAM(s) SubCutaneous every 24 hours    Endocrine/Metabolic Medications  dextrose 50% Injectable 25 Gram(s) IV Push once  dextrose 50% Injectable 12.5 Gram(s) IV Push once  dextrose 50% Injectable 25 Gram(s) IV Push once  dextrose Oral Gel 15 Gram(s) Oral once PRN Blood Glucose LESS THAN 70 milliGRAM(s)/deciliter  fenofibrate Tablet 145 milliGRAM(s) Oral daily  glucagon  Injectable 1 milliGRAM(s) IntraMuscular once  insulin lispro (ADMELOG) corrective regimen sliding scale   SubCutaneous three times a day before meals  insulin lispro (ADMELOG) corrective regimen sliding scale   SubCutaneous at bedtime    --------------------------------------------------------------------------------------    VITAL SIGNS:  T(C): 37.1 (09 Aug 2022 05:00), Max: 37.2 (08 Aug 2022 18:20)  T(F): 98.7 (09 Aug 2022 05:00), Max: 98.9 (08 Aug 2022 18:20)  HR: 72 (09 Aug 2022 05:00) (71 - 87)  BP: 138/76 (09 Aug 2022 05:00) (125/82 - 147/94)  RR: 17 (09 Aug 2022 05:00) (16 - 17)  SpO2: 100% (09 Aug 2022 05:00) (99% - 100%)    --------------------------------------------------------------------------------------    INS AND OUTS:    08 Aug 2022 07:01  -  09 Aug 2022 07:00  --------------------------------------------------------  IN:    Oral Fluid: 300 mL  Total IN: 300 mL    OUT:    Emesis (mL): 0 mL    Stool (mL): 0 mL    Voided (mL): 750 mL  Total OUT: 750 mL    Total NET: -450 mL    --------------------------------------------------------------------------------------    EXAM    NEUROLOGY  Exam: Normal, in no acute distress.    HEENT  Exam: Normocephalic, atraumatic.    RESPIRATORY  Exam: Normal expansion / effort.    CARDIOVASCULAR  Exam: S1, S2.  Regular rate and rhythm.    GI/NUTRITION  Exam: Abdomen soft, Non-tender, Non-distended.  Incisions clean, dry and intact.  Current Diet: Regular diet    MUSCULOSKELETAL  Exam: All extremities moving spontaneously without limitations.      METABOLIC / FLUIDS / ELECTROLYTES  dextrose 5%. 1000 milliLiter(s) IV Continuous <Continuous>  dextrose 5%. 1000 milliLiter(s) IV Continuous <Continuous>      HEMATOLOGIC  [x] VTE Prophylaxis: enoxaparin Injectable 40 milliGRAM(s) SubCutaneous every 24 hours    --------------------------------------------------------------------------------------

## 2022-08-11 PROBLEM — J45.909 UNSPECIFIED ASTHMA, UNCOMPLICATED: Chronic | Status: ACTIVE | Noted: 2022-08-01

## 2022-08-11 PROBLEM — E78.00 PURE HYPERCHOLESTEROLEMIA, UNSPECIFIED: Chronic | Status: ACTIVE | Noted: 2022-08-01

## 2022-08-11 PROBLEM — I10 ESSENTIAL (PRIMARY) HYPERTENSION: Chronic | Status: ACTIVE | Noted: 2022-08-01

## 2022-08-11 PROBLEM — Q24.9 CONGENITAL MALFORMATION OF HEART, UNSPECIFIED: Chronic | Status: ACTIVE | Noted: 2022-08-01

## 2022-08-11 PROBLEM — E11.9 TYPE 2 DIABETES MELLITUS WITHOUT COMPLICATIONS: Chronic | Status: ACTIVE | Noted: 2022-08-01

## 2022-08-15 PROBLEM — K76.9 LIVER LESION: Status: ACTIVE | Noted: 2022-07-26

## 2022-08-16 ENCOUNTER — APPOINTMENT (OUTPATIENT)
Dept: SURGICAL ONCOLOGY | Facility: CLINIC | Age: 37
End: 2022-08-16

## 2022-08-16 VITALS
RESPIRATION RATE: 16 BRPM | TEMPERATURE: 98.6 F | HEIGHT: 69 IN | WEIGHT: 171 LBS | DIASTOLIC BLOOD PRESSURE: 85 MMHG | BODY MASS INDEX: 25.33 KG/M2 | HEART RATE: 85 BPM | SYSTOLIC BLOOD PRESSURE: 132 MMHG | OXYGEN SATURATION: 97 %

## 2022-08-16 DIAGNOSIS — K76.9 LIVER DISEASE, UNSPECIFIED: ICD-10-CM

## 2022-08-16 PROCEDURE — 99214 OFFICE O/P EST MOD 30 MIN: CPT | Mod: 24

## 2022-08-17 LAB — SURGICAL PATHOLOGY STUDY: SIGNIFICANT CHANGE UP

## 2022-08-30 ENCOUNTER — NON-APPOINTMENT (OUTPATIENT)
Age: 37
End: 2022-08-30

## 2022-09-12 NOTE — REASON FOR VISIT
[Post-Op] : a post-op for [FreeTextEntry2] : s/p wedge resection of segment 3 of liver & cholecystectomy on 8/5/2022

## 2022-09-12 NOTE — PHYSICAL EXAM
[Normal] : well developed, well nourished, in no acute distress [FreeTextEntry1] : COVID-19 precautions as per [facility] policy was universally followed  [de-identified] : surgical incision healing well, no evidence of infection

## 2022-09-12 NOTE — CONSULT LETTER
[Dear  ___] : Dear  [unfilled], [Courtesy Letter:] : I had the pleasure of seeing your patient, [unfilled], in my office today. [Please see my note below.] : Please see my note below. [Consult Closing:] : Thank you very much for allowing me to participate in the care of this patient.  If you have any questions, please do not hesitate to contact me. [Sincerely,] : Sincerely, [( Thank you for referring [unfilled] for consultation for _____ )] : Thank you for referring [unfilled] for consultation for [unfilled] [FreeTextEntry2] : Howard Ferraro MD [FreeTextEntry3] : Shant Crockett MD, FICS, FACS\par Director of Surgical Oncology- [facility] \par , Department of Surgery  \par [employer] [employer] [facility] \par [address]\[place] [address]\par \par [address]\[place] [address]\par \par [address]\[place] [address]\par \par (mob) [phone]\par (o) [phone]\par (f) [phone]\par

## 2022-09-12 NOTE — HISTORY OF PRESENT ILLNESS
[de-identified] : Mr. DEENA UMANZOR is a 36 year old man, referred by Dr. Howard Ferraro, presents today for a post-op visit s/p wedge resection of segment 3 of liver & cholecystectomy on 8/5/2022\par \par Deena initially complained of intermittent abdominal pain (LUQ) in 2020, that was noted to be gallbladder polyps (imaging & report not available). He denies any abdominal pain, nausea, vomiting or associated symptoms. He is planning on moving [facility] at the end of the summer which he states prompted a repeat abdominal ultrasound (7/8/2022)\par US showed: indeterminate liver lesion, small echogenic areas in the gallbladder (likely polyps), simple appearing bilateral kidney cysts.\par \par A follow-up abdominal MRI on 7/22/2022 showed liver lesion in the lateral segment left lobe that demonstrates arterial enhancement measuring up to 2.0 x 2.0 cm. By the portal venous phase there is evidence of washout and pseudocapsule. The lesion appears mildly T2 hyperintense, there is no significant associated restricted diffusion, no definite fat identified within the lesion, lesion was present on prior CT angiogram from 2020 at that time it measured 1.7 cm \par \par CEA & AFP WNL, he reports a normal hepatitis panel done by Dr. Ferraro \par \par He reports only symptom of vague LUQ pain that is intermittent but has persisted over the last 2 years (attributed to gallbladder polyps). Denies nausea, vomiting, diarrhea or weight loss. Reports regular appetite and weight is well maintained, along with regular bowel movements. \par \par Deena is now s/p wedge resection of segment 3 of liver & cholecystectomy on 8/5/2022, final pathology is pending at this time.

## 2022-09-12 NOTE — ASSESSMENT
[FreeTextEntry1] : IMP:\par 35yo M with liver lesion, first noted in 2020 measuring 1.7 cm\par now measuring 2.0 x 2.0 cm in 2022, and multiple gallbladder polyps (reports frequent LUQ pain)\par \par He is now s/p wedge resection of segment 3 of liver & cholecystectomy on 8/5/2022, final pathology pending\par \par PLAN:\par [place], HIs final pathology is consistent with hepatic adenoma- margins are negative for tumor.\par RTO in 1 month \par \par \par I have discussed the diagnosis, therapeutic plan and options with the patient at length. Patient expressed verbal understanding to proceed with the proposed plan. All questions answered.

## 2022-10-04 ENCOUNTER — APPOINTMENT (OUTPATIENT)
Dept: SURGICAL ONCOLOGY | Facility: CLINIC | Age: 37
End: 2022-10-04

## 2022-10-04 VITALS
HEART RATE: 91 BPM | WEIGHT: 175 LBS | OXYGEN SATURATION: 98 % | BODY MASS INDEX: 25.92 KG/M2 | SYSTOLIC BLOOD PRESSURE: 130 MMHG | DIASTOLIC BLOOD PRESSURE: 87 MMHG | TEMPERATURE: 97.4 F | HEIGHT: 69 IN | RESPIRATION RATE: 16 BRPM

## 2022-10-04 PROCEDURE — 99214 OFFICE O/P EST MOD 30 MIN: CPT | Mod: 24

## 2022-10-04 NOTE — CONSULT LETTER
[Dear  ___] : Dear  [unfilled], [Courtesy Letter:] : I had the pleasure of seeing your patient, [unfilled], in my office today. [( Thank you for referring [unfilled] for consultation for _____ )] : Thank you for referring [unfilled] for consultation for [unfilled] [Please see my note below.] : Please see my note below. [Consult Closing:] : Thank you very much for allowing me to participate in the care of this patient.  If you have any questions, please do not hesitate to contact me. [Sincerely,] : Sincerely, [FreeTextEntry2] : Howard Ferraro MD [FreeTextEntry3] : Shant Crockett MD, FICS, FACS\par Director of Surgical Oncology- [facility] \par , Department of Surgery  \par [employer] [facility] [employer] at [facility] \par [address]\[place] [address]\par \par [address]\[place] [address]\par \par [address]\[place] [address]\par \par (mob) [phone]\par (o) [phone]\par (f) [phone]\par

## 2022-10-04 NOTE — PHYSICAL EXAM
[Normal] : well developed, well nourished, in no acute distress [FreeTextEntry1] : COVID-19 precautions as per [facility] policy was universally followed  [de-identified] : surgical incision healing well, no evidence of infection

## 2022-10-04 NOTE — ASSESSMENT
[FreeTextEntry1] : IMP:\par 35yo M with liver lesion, first noted in 2020 measuring 1.7 cm\par now measuring 2.0 x 2.0 cm in 2022, and multiple gallbladder polyps (reports frequent LUQ pain)\par \par He is now s/p robotic partial hepatectomy & cholecystectomy on 8/5/2022, final pathology is consistent with hepatic adenoma- margins are negative for tumor.\par \par PLAN:\par RTO 6 months\par Will continue surveillance given a small adenoma in the right lobe of the liver\par \par \par I have discussed the diagnosis, therapeutic plan and options with the patient at length. Patient expressed verbal understanding to proceed with the proposed plan. All questions answered.

## 2022-10-04 NOTE — HISTORY OF PRESENT ILLNESS
[de-identified] : Mr. DEENA UMANZOR is a 36 year old man, referred by Dr. Howard Ferraro, presents today for a post-op visit s/p wedge resection of segment 3 of liver & cholecystectomy on 8/5/2022\par \par Deena initially complained of intermittent abdominal pain (LUQ) in 2020, that was noted to be gallbladder polyps (imaging & report not available). He denies any abdominal pain, nausea, vomiting or associated symptoms. He is planning on moving [facility] at the end of the summer which he states prompted a repeat abdominal ultrasound (7/8/2022)\par US showed: indeterminate liver lesion, small echogenic areas in the gallbladder (likely polyps), simple appearing bilateral kidney cysts.\par \par A follow-up abdominal MRI on 7/22/2022 showed liver lesion in the lateral segment left lobe that demonstrates arterial enhancement measuring up to 2.0 x 2.0 cm. By the portal venous phase there is evidence of washout and pseudocapsule. The lesion appears mildly T2 hyperintense, there is no significant associated restricted diffusion, no definite fat identified within the lesion, lesion was present on prior CT angiogram from 2020 at that time it measured 1.7 cm \par \par CEA & AFP WNL, he reports a normal hepatitis panel done by Dr. Ferraro \par \par He reports only symptom of vague LUQ pain that is intermittent but has persisted over the last 2 years (attributed to gallbladder polyps). Denies nausea, vomiting, diarrhea or weight loss. Reports regular appetite and weight is well maintained, along with regular bowel movements. \par \par Deena is now s/p wedge resection of segment 3 of liver & cholecystectomy on 8/5/2022, final pathology is consistent with hepatic adenoma- margins are negative for tumor.

## 2023-02-17 ENCOUNTER — OUTPATIENT (OUTPATIENT)
Dept: OUTPATIENT SERVICES | Facility: HOSPITAL | Age: 38
LOS: 1 days | End: 2023-02-17

## 2023-02-17 ENCOUNTER — APPOINTMENT (OUTPATIENT)
Dept: MRI IMAGING | Facility: CLINIC | Age: 38
End: 2023-02-17
Payer: COMMERCIAL

## 2023-02-17 PROCEDURE — 74183 MRI ABD W/O CNTR FLWD CNTR: CPT | Mod: 26

## 2023-02-28 ENCOUNTER — APPOINTMENT (OUTPATIENT)
Dept: SURGICAL ONCOLOGY | Facility: CLINIC | Age: 38
End: 2023-02-28
Payer: COMMERCIAL

## 2023-02-28 DIAGNOSIS — K82.4 CHOLESTEROLOSIS OF GALLBLADDER: ICD-10-CM

## 2023-02-28 PROCEDURE — 99203 OFFICE O/P NEW LOW 30 MIN: CPT | Mod: 95

## 2023-03-05 NOTE — ASSESSMENT
[FreeTextEntry1] : IMP:\par 38yo M with liver lesion, first noted in 2020 measuring 1.7 cm\par now measuring 2.0 x 2.0 cm in 2022, and multiple gallbladder polyps (reports frequent LUQ pain)\par \par He is now s/p robotic partial hepatectomy & cholecystectomy on 8/5/2022, final pathology is consistent with hepatic adenoma- margins are negative for tumor.\par \par MR/MRCP 2/2023 - 1.2 cm hemangioma in segment eight of the liver, unchanged.  A 1.1 cm mass in segment five of the liver has a similar imaging appearance to the mass that was resected from segment two. It may therefore also represent an adenoma.\par \par PLAN:\par RTO 6 months\par Will continue surveillance given a small adenoma in the right lobe of the liver\par \par \par I have discussed the diagnosis, therapeutic plan and options with the patient at length. Patient expressed verbal understanding to proceed with the proposed plan. All questions answered.

## 2023-03-05 NOTE — REASON FOR VISIT
[Follow-Up Visit] : a follow-up visit for [FreeTextEntry2] : s/p wedge resection of segment 3 of liver & cholecystectomy on 8/5/2022

## 2023-03-05 NOTE — PHYSICAL EXAM
[Normal] : well developed, well nourished, in no acute distress [FreeTextEntry1] : COVID-19 precautions as per [facility] policy was universally followed  [de-identified] : surgical incision healing well, no evidence of infection

## 2023-03-05 NOTE — CONSULT LETTER
[Dear  ___] : Dear  [unfilled], [Courtesy Letter:] : I had the pleasure of seeing your patient, [unfilled], in my office today. [( Thank you for referring [unfilled] for consultation for _____ )] : Thank you for referring [unfilled] for consultation for [unfilled] [Please see my note below.] : Please see my note below. [Consult Closing:] : Thank you very much for allowing me to participate in the care of this patient.  If you have any questions, please do not hesitate to contact me. [Sincerely,] : Sincerely, [FreeTextEntry2] : Howard Ferraro MD [FreeTextEntry3] : Shant Crockett MD, FICS, FACS\par Director of Surgical Oncology- [facility] \par , Department of Surgery  \par [employer] [facility] [employer] [facility] \par [address]\[place] [address]\par \par [address]\[place] [address]\par \par [address]\[place] [address]\par \par (mob) [phone]\par (o) [phone]\par (f) [phone]\par

## 2023-03-05 NOTE — HISTORY OF PRESENT ILLNESS
[de-identified] : This visit was provided via telehealth using real-time 2-way audio visual technology. The patient, DEENA UMANZOR, was located at home [address] [place] [address] at the time of the visit. This provider was located at the clinic in [place], [place] at the time of the visit. The provider, patient participated in the telehealth encounter.  Verbal consent was given Feb 28 2023  5:00PM by the patient. \par \par Mr. DEENA UMANZOR is a 36 year old man, referred by Dr. Howard Ferraro, presents today for a telehealth follow-up visit \par \par Deena initially complained of intermittent abdominal pain (LUQ) in 2020, that was noted to be gallbladder polyps (imaging & report not available). He denies any abdominal pain, nausea, vomiting or associated symptoms. He is planning on moving [facility] at the end of the summer which he states prompted a repeat abdominal ultrasound (7/8/2022)\par US showed: indeterminate liver lesion, small echogenic areas in the gallbladder (likely polyps), simple appearing bilateral kidney cysts.\par \par A follow-up abdominal MRI on 7/22/2022 showed liver lesion in the lateral segment left lobe that demonstrates arterial enhancement measuring up to 2.0 x 2.0 cm. By the portal venous phase there is evidence of washout and pseudocapsule. The lesion appears mildly T2 hyperintense, there is no significant associated restricted diffusion, no definite fat identified within the lesion, lesion was present on prior CT angiogram from 2020 at that time it measured 1.7 cm \par \par CEA & AFP WNL, he reports a normal hepatitis panel done by Dr. Ferraro \par \par He reports only symptom of vague LUQ pain that is intermittent but has persisted over the last 2 years (attributed to gallbladder polyps). Denies nausea, vomiting, diarrhea or weight loss. Reports regular appetite and weight is well maintained, along with regular bowel movements. \par \par **SURGERY**\par Deena is now s/p wedge resection of segment 3 of liver & cholecystectomy on 8/5/2022, final pathology is consistent with hepatic adenoma- margins are negative for tumor.\par \par MR/MRCP 2/2023 - 1.2 cm hemangioma in segment eight of the liver, unchanged.  A 1.1 cm mass in segment five of the liver has a similar imaging appearance to the mass that was resected from segment two. It may therefore also represent an adenoma.\par \par

## 2023-08-28 ENCOUNTER — APPOINTMENT (OUTPATIENT)
Dept: MRI IMAGING | Facility: IMAGING CENTER | Age: 38
End: 2023-08-28
Payer: COMMERCIAL

## 2023-08-28 ENCOUNTER — OUTPATIENT (OUTPATIENT)
Dept: OUTPATIENT SERVICES | Facility: HOSPITAL | Age: 38
LOS: 1 days | End: 2023-08-28
Payer: COMMERCIAL

## 2023-08-28 DIAGNOSIS — K76.9 LIVER DISEASE, UNSPECIFIED: ICD-10-CM

## 2023-08-28 PROCEDURE — 74183 MRI ABD W/O CNTR FLWD CNTR: CPT

## 2023-08-28 PROCEDURE — A9585: CPT

## 2023-08-28 PROCEDURE — 74183 MRI ABD W/O CNTR FLWD CNTR: CPT | Mod: 26

## 2023-10-17 ENCOUNTER — APPOINTMENT (OUTPATIENT)
Age: 38
End: 2023-10-17
Payer: COMMERCIAL

## 2023-10-17 DIAGNOSIS — D13.4 BENIGN NEOPLASM OF LIVER: ICD-10-CM

## 2023-10-17 PROCEDURE — 99214 OFFICE O/P EST MOD 30 MIN: CPT | Mod: 95

## 2023-12-14 NOTE — PATIENT PROFILE ADULT - NSPROMEDSADMININFO_GEN_A_NUR
finished chemo and scheduled for surgical resection January 12, will continue to follow along with oncology and surgical oncology no concerns

## 2024-08-25 ENCOUNTER — OUTPATIENT (OUTPATIENT)
Dept: OUTPATIENT SERVICES | Facility: HOSPITAL | Age: 39
LOS: 1 days | End: 2024-08-25
Payer: COMMERCIAL

## 2024-08-25 DIAGNOSIS — D13.4 BENIGN NEOPLASM OF LIVER: ICD-10-CM

## 2024-08-25 PROCEDURE — A9585: CPT

## 2024-08-25 PROCEDURE — 74183 MRI ABD W/O CNTR FLWD CNTR: CPT

## 2024-09-11 ENCOUNTER — APPOINTMENT (OUTPATIENT)
Dept: SURGICAL ONCOLOGY | Facility: CLINIC | Age: 39
End: 2024-09-11
Payer: COMMERCIAL

## 2024-09-11 DIAGNOSIS — K76.9 LIVER DISEASE, UNSPECIFIED: ICD-10-CM

## 2024-09-11 PROCEDURE — 99214 OFFICE O/P EST MOD 30 MIN: CPT

## 2024-09-17 NOTE — HISTORY OF PRESENT ILLNESS
[de-identified] : Mr. DEENA UMANZOR is a 37 year old M, referred by Dr. Howard Ferraro, presents today for a telehealth follow-up visit.   Deena initially c/o intermittent abdominal pain (LUQ) in 2020, that was noted to be gallbladder polyps (imaging & report not available). He denies any abdominal pain, nausea, vomiting or associated symptoms. He is planning on moving [facility] at the end of the summer which he states prompted a repeat abdominal ultrasound (7/8/2022)  US showed: indeterminate liver lesion, small echogenic areas in the gallbladder (likely polyps), simple appearing bilateral kidney cysts. A follow-up abdominal MRI on 7/22/2022 showed liver lesion in the lateral segment left lobe that demonstrates arterial enhancement measuring up to 2.0 x 2.0 cm. By the portal venous phase there is evidence of washout and pseudocapsule. The lesion appears mildly T2 hyperintense, there is no significant associated restricted diffusion, no definite fat identified within the lesion, lesion was present on prior CT angiogram from 2020 at that time it measured 1.7 cm  CEA & AFP WNL, he reports a normal hepatitis panel done by Dr. Ferraro  He reports only symptom of vague LUQ pain that is intermittent but has persisted over the last 2 years (attributed to gallbladder polyps). Denies nausea, vomiting, diarrhea or weight loss. Reports regular appetite and weight is well maintained, along with regular bowel movements.    **SURGERY** S/P wedge resection of segment 3 of liver & cholecystectomy on 8/5/2022, final pathology is consistent with hepatic adenoma- margins are negative for tumor.   MR/MRCP 2/2023 - 1.2 cm hemangioma in segment eight of the liver, unchanged. A 1.1 cm mass in segment five of the liver has a similar imaging appearance to the mass that was resected from segment two. It may therefore also represent an adenoma.  MR/MRCP, 08/28/23; findings c/w Unchanged 1.4 cm hepatic adenoma. 1.1 cm flash filling hepatic hemangioma. Decrease in size of a subcapsular seroma at prior resection site. Hepatic steatosis.  8/25/24 MRCP-Interval increase in size of hepatic segment 8 lesion, 2.7 x 1.9 cm, previously 2.1 x 1.6 cm on 8/28/2023 and 1.1 x 0.8 cm on 7/22/2022. Imaging characteristics are not typical for hemangioma and is indeterminate. Finding may represent an additional hepatic adenoma, however atypical. Given progressive interval growth since 2022, tissue sampling can be considered for further evaluation. Segment 5 lesion adjacent to the gallbladder fossa, stable compared to prior, compatible with a hepatic adenoma. Additional stable subcentimeter segment 6 hepatic focus with similar imaging characteristics may also represent a small adenoma. Segment 2 subcapsular fluid collection, compatible with postoperative seroma, mildly decreased in size compared to prior 8/28/2023.  9/11/24 Pt reported feeling well. CEA 4.3. Repeat test ordered, pt referred to advanced GI, Dr. Robles.   [TextBox_4] : 02/28/23

## 2024-09-17 NOTE — CONSULT LETTER
[Dear  ___] : Dear  [unfilled], [Consult Letter:] : I had the pleasure of evaluating your patient, [unfilled]. [Consult Closing:] : Thank you very much for allowing me to participate in the care of this patient.  If you have any questions, please do not hesitate to contact me. [Sincerely,] : Sincerely, [FreeTextEntry3] : Shant Crockett MD, FICS, FACS Director of Surgical Oncology- [facility] , Department of Surgery [employer] [facility] [employer] [facility] at [place] [id number]  [address] [place] [address]  [address] [place] [address] (mob) [phone] (o) [phone] (f) [phone]

## 2024-09-17 NOTE — REASON FOR VISIT
[Follow-Up Visit] : a follow-up visit for [Home] : at home, [unfilled] , at the time of the visit. [Medical Office: ([facility])___] : at the medical office located in  [Patient] : the patient [FreeTextEntry2] : hepatic adenoma

## 2024-09-17 NOTE — CONSULT LETTER
[Dear  ___] : Dear  [unfilled], [Consult Letter:] : I had the pleasure of evaluating your patient, [unfilled]. [Consult Closing:] : Thank you very much for allowing me to participate in the care of this patient.  If you have any questions, please do not hesitate to contact me. [Sincerely,] : Sincerely, [FreeTextEntry3] : Shant Crockett MD, FICS, FACS Director of Surgical Oncology- [facility] , Department of Surgery [facility] [employer] [facility] at [place] [id number]  [address] [place] [address]  [address] [place] [address] (mob) [phone] (o) [phone] (f) [phone]

## 2024-09-17 NOTE — CONSULT LETTER
[Dear  ___] : Dear  [unfilled], [Consult Letter:] : I had the pleasure of evaluating your patient, [unfilled]. [Consult Closing:] : Thank you very much for allowing me to participate in the care of this patient.  If you have any questions, please do not hesitate to contact me. [Sincerely,] : Sincerely, [FreeTextEntry3] : Shant Crockett MD, FICS, FACS Director of Surgical Oncology- [facility] , Department of Surgery [facility] [employer] [facility] at [place] [id number]  [address] [place] [address]  [address] [place] [address] (mob) [phone] (o) [phone] (f) [phone]

## 2024-09-17 NOTE — HISTORY OF PRESENT ILLNESS
[de-identified] : Mr. DEENA UMANZOR is a 37 year old M, referred by Dr. Howard Ferraro, presents today for a telehealth follow-up visit.   Deena initially c/o intermittent abdominal pain (LUQ) in 2020, that was noted to be gallbladder polyps (imaging & report not available). He denies any abdominal pain, nausea, vomiting or associated symptoms. He is planning on moving [facility] at the end of the summer which he states prompted a repeat abdominal ultrasound (7/8/2022)  US showed: indeterminate liver lesion, small echogenic areas in the gallbladder (likely polyps), simple appearing bilateral kidney cysts. A follow-up abdominal MRI on 7/22/2022 showed liver lesion in the lateral segment left lobe that demonstrates arterial enhancement measuring up to 2.0 x 2.0 cm. By the portal venous phase there is evidence of washout and pseudocapsule. The lesion appears mildly T2 hyperintense, there is no significant associated restricted diffusion, no definite fat identified within the lesion, lesion was present on prior CT angiogram from 2020 at that time it measured 1.7 cm  CEA & AFP WNL, he reports a normal hepatitis panel done by Dr. Ferraro  He reports only symptom of vague LUQ pain that is intermittent but has persisted over the last 2 years (attributed to gallbladder polyps). Denies nausea, vomiting, diarrhea or weight loss. Reports regular appetite and weight is well maintained, along with regular bowel movements.    **SURGERY** S/P wedge resection of segment 3 of liver & cholecystectomy on 8/5/2022, final pathology is consistent with hepatic adenoma- margins are negative for tumor.   MR/MRCP 2/2023 - 1.2 cm hemangioma in segment eight of the liver, unchanged. A 1.1 cm mass in segment five of the liver has a similar imaging appearance to the mass that was resected from segment two. It may therefore also represent an adenoma.  MR/MRCP, 08/28/23; findings c/w Unchanged 1.4 cm hepatic adenoma. 1.1 cm flash filling hepatic hemangioma. Decrease in size of a subcapsular seroma at prior resection site. Hepatic steatosis.  8/25/24 MRCP-Interval increase in size of hepatic segment 8 lesion, 2.7 x 1.9 cm, previously 2.1 x 1.6 cm on 8/28/2023 and 1.1 x 0.8 cm on 7/22/2022. Imaging characteristics are not typical for hemangioma and is indeterminate. Finding may represent an additional hepatic adenoma, however atypical. Given progressive interval growth since 2022, tissue sampling can be considered for further evaluation. Segment 5 lesion adjacent to the gallbladder fossa, stable compared to prior, compatible with a hepatic adenoma. Additional stable subcentimeter segment 6 hepatic focus with similar imaging characteristics may also represent a small adenoma. Segment 2 subcapsular fluid collection, compatible with postoperative seroma, mildly decreased in size compared to prior 8/28/2023.  9/11/24 Pt reported feeling well. CEA 4.3. Repeat test ordered, pt referred to advanced GI, Dr. Robles.   [TextBox_4] : 02/28/23

## 2024-09-17 NOTE — REASON FOR VISIT
[Follow-Up Visit] : a follow-up visit for [Home] : at home, [unfilled] , at the time of the visit. [Medical Office: ([facility])___] : at the medical office located in  [Patient] : the patient [FreeTextEntry2] : hepatic adenoma

## 2024-09-17 NOTE — REASON FOR VISIT
[Follow-Up Visit] : a follow-up visit for [Home] : at home, [unfilled] , at the time of the visit. [Medical Office: ([facility])___] : at the medical office located in  [Patient] : the patient [FreeTextEntry2] : hepatic adenoma

## 2024-09-17 NOTE — ASSESSMENT
[FreeTextEntry1] : IMP: 37 y.o M with liver lesion, first noted in 2020, measuring 1.7 cm,  measuring 2.0 x 2.0 cm in 2022 and multiple gallbaldder polyps.  Patient is S/P robotic partial hepatectomy and cholecystectomy, 08/05/2022, Final Path: c/w hepatic adenoma, margins negative for tumor.   Most Recent MR/MRCP, 08/28/23; findings showed Unchanged 1.4 cm hepatic adenoma. 1.1 cm flash filling hepatic hemangioma. Decrease in size of a subcapsular seroma at prior resection site. Hepatic steatosis.  8/25/2024 MRCP  Interval increase in size of hepatic segment 8 lesion, 2.7 x 1.9 cm, previously 2.1 x 1.6 cm on 8/28/2023 and 1.1 x 0.8 cm on 7/22/2022. Imaging characteristics are not typical for hemangioma and is indeterminate. Finding may represent an additional hepatic adenoma, however atypical. Given progressive interval growth since 2022, tissue sampling can be considered for further evaluation.  PLAN:  Will cont surveillance   I have discussed the diagnosis, therapeutic plan and options with the patient at length. Patient expressed verbal understanding to proceed with proposed plan. All questions answered.

## 2024-09-17 NOTE — HISTORY OF PRESENT ILLNESS
[de-identified] : Mr. DEENA UMANZOR is a 37 year old M, referred by Dr. Howard Ferraro, presents today for a telehealth follow-up visit.   Deena initially c/o intermittent abdominal pain (LUQ) in 2020, that was noted to be gallbladder polyps (imaging & report not available). He denies any abdominal pain, nausea, vomiting or associated symptoms. He is planning on moving [facility] at the end of the summer which he states prompted a repeat abdominal ultrasound (7/8/2022)  US showed: indeterminate liver lesion, small echogenic areas in the gallbladder (likely polyps), simple appearing bilateral kidney cysts. A follow-up abdominal MRI on 7/22/2022 showed liver lesion in the lateral segment left lobe that demonstrates arterial enhancement measuring up to 2.0 x 2.0 cm. By the portal venous phase there is evidence of washout and pseudocapsule. The lesion appears mildly T2 hyperintense, there is no significant associated restricted diffusion, no definite fat identified within the lesion, lesion was present on prior CT angiogram from 2020 at that time it measured 1.7 cm  CEA & AFP WNL, he reports a normal hepatitis panel done by Dr. Ferraro  He reports only symptom of vague LUQ pain that is intermittent but has persisted over the last 2 years (attributed to gallbladder polyps). Denies nausea, vomiting, diarrhea or weight loss. Reports regular appetite and weight is well maintained, along with regular bowel movements.    **SURGERY** S/P wedge resection of segment 3 of liver & cholecystectomy on 8/5/2022, final pathology is consistent with hepatic adenoma- margins are negative for tumor.   MR/MRCP 2/2023 - 1.2 cm hemangioma in segment eight of the liver, unchanged. A 1.1 cm mass in segment five of the liver has a similar imaging appearance to the mass that was resected from segment two. It may therefore also represent an adenoma.  MR/MRCP, 08/28/23; findings c/w Unchanged 1.4 cm hepatic adenoma. 1.1 cm flash filling hepatic hemangioma. Decrease in size of a subcapsular seroma at prior resection site. Hepatic steatosis.  8/25/24 MRCP-Interval increase in size of hepatic segment 8 lesion, 2.7 x 1.9 cm, previously 2.1 x 1.6 cm on 8/28/2023 and 1.1 x 0.8 cm on 7/22/2022. Imaging characteristics are not typical for hemangioma and is indeterminate. Finding may represent an additional hepatic adenoma, however atypical. Given progressive interval growth since 2022, tissue sampling can be considered for further evaluation. Segment 5 lesion adjacent to the gallbladder fossa, stable compared to prior, compatible with a hepatic adenoma. Additional stable subcentimeter segment 6 hepatic focus with similar imaging characteristics may also represent a small adenoma. Segment 2 subcapsular fluid collection, compatible with postoperative seroma, mildly decreased in size compared to prior 8/28/2023.  9/11/24 Pt reported feeling well. CEA 4.3. Repeat test ordered, pt referred to advanced GI, Dr. Robles.   [TextBox_4] : 02/28/23

## 2024-10-30 ENCOUNTER — APPOINTMENT (OUTPATIENT)
Dept: SURGICAL ONCOLOGY | Facility: CLINIC | Age: 39
End: 2024-10-30

## 2024-10-30 ENCOUNTER — NON-APPOINTMENT (OUTPATIENT)
Age: 39
End: 2024-10-30

## 2024-10-30 DIAGNOSIS — D13.4 BENIGN NEOPLASM OF LIVER: ICD-10-CM

## 2024-10-30 PROCEDURE — 99214 OFFICE O/P EST MOD 30 MIN: CPT

## 2024-12-04 NOTE — ASU PREOP CHECKLIST - DNR CLARIFICATION FORM COMPLETED
Mohs Case Number: [id number] Date Of Previous Biopsy (Optional): 10/22/24 Previous Accession (Optional): [id number] Biopsy Photograph Reviewed: Yes Referring Physician (Optional): Jagdeep Consent Type: Consent 1 (Standard) Eye Shield Used: No Initial Size Of Lesion: 0.7 X Size Of Lesion In Cm (Optional): 0.5 Number Of Stages: 2 Primary Defect Length In Cm (Final Defect Size - Required For Flaps/Grafts): 1.1 Primary Defect Width In Cm (Final Defect Size - Required For Flaps/Grafts): 0.9 Primary Defect Depth In Cm (Optional But Required For Some Insurers): 0 Repair Type: Referred to plastics for closure Which Instrument Did You Use For Dermabrasion?: Wire Brush Which Eyelid Repair Cpt Are You Using?: 98278 Oculoplastic Surgeon Procedure Text (A): After obtaining clear surgical margins the patient was sent to oculoplastics for surgical repair.  The patient understands they will receive post-surgical care and follow-up from the referring physician's office. Otolaryngologist Procedure Text (A): After obtaining clear surgical margins the patient was sent to otolaryngology for surgical repair.  The patient understands they will receive post-surgical care and follow-up from the referring physician's office. Plastic Surgeon Procedure Text (A): After obtaining clear surgical margins the patient was sent to plastics for surgical repair.  The patient understands they will receive post-surgical care and follow-up from the referring physician's office. Mid-Level Procedure Text (A): After obtaining clear surgical margins the patient was sent to a mid-level provider for surgical repair.  The patient understands they will receive post-surgical care and follow-up from the mid-level provider. Provider Procedure Text (A): After obtaining clear surgical margins the defect was repaired by another provider. Asc Procedure Text (A): After obtaining clear surgical margins the patient was sent to an ASC for surgical repair.  The patient understands they will receive post-surgical care and follow-up from the ASC physician. Suturegard Retention Suture: 2-0 Nylon Retention Suture Bite Size: 3 mm Length To Time In Minutes Device Was In Place: 10 Number Of Hemigard Strips Per Side: 1 Undermining Type: Entire Wound Debridement Text: The wound edges were debrided prior to proceeding with the closure to facilitate wound healing. Helical Rim Text: The closure involved the helical rim. Vermilion Border Text: The closure involved the vermilion border. Nostril Rim Text: The closure involved the nostril rim. Retention Suture Text: Retention sutures were placed to support the closure and prevent dehiscence. n/a Area H Indication Text: Tumors in this location are included in Area H (eyelids, eyebrows, nose, lips, chin, ear, pre-auricular, post-auricular, temple, genitalia, hands, feet, ankles and areola).  Tissue conservation is critical in these anatomic locations. Area M Indication Text: Tumors in this location are included in Area M (cheek, forehead, scalp, neck, jawline and pretibial skin).  Mohs surgery is indicated for tumors in these anatomic locations. Area L Indication Text: Tumors in this location are included in Area L (trunk and extremities).  Mohs surgery is indicated for larger tumors, or tumors with aggressive histologic features, in these anatomic locations. Special Stains Stage 1 - Results: Base On Clearance Noted Above Stage 2: Additional Anesthesia Type: 1% lidocaine with epinephrine Staging Info: By selecting yes to the question above you will include information on AJCC 8 tumor staging in your Mohs note. Information on tumor staging will be automatically added for SCCs on the head and neck. AJCC 8 includes tumor size, tumor depth, perineural involvement and bone invasion. Tumor Depth: Less than 6mm from granular layer and no invasion beyond the subcutaneous fat Was The Patient On Physician Recommended Anticoagulation Therapy?: Please Select the Appropriate Response Medical Necessity Statement: Based on my medical judgement, Mohs surgery is the most appropriate treatment for this cancer compared to other treatments. Alternatives Discussed Intro (Do Not Add Period): I discussed alternative treatments to Mohs surgery and specifically discussed the risks and benefits of Consent 1/Introductory Paragraph: The rationale for Mohs was explained to the patient and consent was obtained. The risks, benefits and alternatives to therapy were discussed in detail. Specifically, the risks of infection, scarring, bleeding, prolonged wound healing, incomplete removal, allergy to anesthesia, nerve injury and recurrence were addressed. Prior to the procedure, the treatment site was clearly identified and confirmed by the patient. All components of Universal Protocol/PAUSE Rule completed. Consent 2/Introductory Paragraph: Mohs surgery was explained to the patient and consent was obtained. The risks, benefits and alternatives to therapy were discussed in detail. Specifically, the risks of infection, scarring, bleeding, prolonged wound healing, incomplete removal, allergy to anesthesia, nerve injury and recurrence were addressed. Prior to the procedure, the treatment site was clearly identified and confirmed by the patient. All components of Universal Protocol/PAUSE Rule completed. Consent 3/Introductory Paragraph: I gave the patient a chance to ask questions they had about the procedure.  Following this I explained the Mohs procedure and consent was obtained. The risks, benefits and alternatives to therapy were discussed in detail. Specifically, the risks of infection, scarring, bleeding, prolonged wound healing, incomplete removal, allergy to anesthesia, nerve injury and recurrence were addressed. Prior to the procedure, the treatment site was clearly identified and confirmed by the patient. All components of Universal Protocol/PAUSE Rule completed. Consent (Temporal Branch)/Introductory Paragraph: The rationale for Mohs was explained to the patient and consent was obtained. The risks, benefits and alternatives to therapy were discussed in detail. Specifically, the risks of damage to the temporal branch of the facial nerve, infection, scarring, bleeding, prolonged wound healing, incomplete removal, allergy to anesthesia, and recurrence were addressed. Prior to the procedure, the treatment site was clearly identified and confirmed by the patient. All components of Universal Protocol/PAUSE Rule completed. Consent (Marginal Mandibular)/Introductory Paragraph: The rationale for Mohs was explained to the patient and consent was obtained. The risks, benefits and alternatives to therapy were discussed in detail. Specifically, the risks of damage to the marginal mandibular branch of the facial nerve, infection, scarring, bleeding, prolonged wound healing, incomplete removal, allergy to anesthesia, and recurrence were addressed. Prior to the procedure, the treatment site was clearly identified and confirmed by the patient. All components of Universal Protocol/PAUSE Rule completed. Consent (Spinal Accessory)/Introductory Paragraph: The rationale for Mohs was explained to the patient and consent was obtained. The risks, benefits and alternatives to therapy were discussed in detail. Specifically, the risks of damage to the spinal accessory nerve, infection, scarring, bleeding, prolonged wound healing, incomplete removal, allergy to anesthesia, and recurrence were addressed. Prior to the procedure, the treatment site was clearly identified and confirmed by the patient. All components of Universal Protocol/PAUSE Rule completed. Consent (Near Eyelid Margin)/Introductory Paragraph: The rationale for Mohs was explained to the patient and consent was obtained. The risks, benefits and alternatives to therapy were discussed in detail. Specifically, the risks of ectropion or eyelid deformity, infection, scarring, bleeding, prolonged wound healing, incomplete removal, allergy to anesthesia, nerve injury and recurrence were addressed. Prior to the procedure, the treatment site was clearly identified and confirmed by the patient. All components of Universal Protocol/PAUSE Rule completed. Consent (Ear)/Introductory Paragraph: The rationale for Mohs was explained to the patient and consent was obtained. The risks, benefits and alternatives to therapy were discussed in detail. Specifically, the risks of ear deformity, infection, scarring, bleeding, prolonged wound healing, incomplete removal, allergy to anesthesia, nerve injury and recurrence were addressed. Prior to the procedure, the treatment site was clearly identified and confirmed by the patient. All components of Universal Protocol/PAUSE Rule completed. Consent (Nose)/Introductory Paragraph: The rationale for Mohs was explained to the patient and consent was obtained. The risks, benefits and alternatives to therapy were discussed in detail. Specifically, the risks of nasal deformity, changes in the flow of air through the nose, infection, scarring, bleeding, prolonged wound healing, incomplete removal, allergy to anesthesia, nerve injury and recurrence were addressed. Prior to the procedure, the treatment site was clearly identified and confirmed by the patient. All components of Universal Protocol/PAUSE Rule completed. Consent (Lip)/Introductory Paragraph: The rationale for Mohs was explained to the patient and consent was obtained. The risks, benefits and alternatives to therapy were discussed in detail. Specifically, the risks of lip deformity, changes in the oral aperture, infection, scarring, bleeding, prolonged wound healing, incomplete removal, allergy to anesthesia, nerve injury and recurrence were addressed. Prior to the procedure, the treatment site was clearly identified and confirmed by the patient. All components of Universal Protocol/PAUSE Rule completed. Consent (Scalp)/Introductory Paragraph: The rationale for Mohs was explained to the patient and consent was obtained. The risks, benefits and alternatives to therapy were discussed in detail. Specifically, the risks of changes in hair growth pattern secondary to repair, infection, scarring, bleeding, prolonged wound healing, incomplete removal, allergy to anesthesia, nerve injury and recurrence were addressed. Prior to the procedure, the treatment site was clearly identified and confirmed by the patient. All components of Universal Protocol/PAUSE Rule completed. Detail Level: Detailed Postop Diagnosis: same Surgeon: Dr. Casanova Anesthesia Type: 1% lidocaine with 1:100,000 epinephrine and 408mcg clindamycin/ml and a 1:10 solution of 8.4% sodium bicarbonate Anesthesia Volume In Cc: 6 Hemostasis: Electrofulguration Estimated Blood Loss (Cc): minimal Anesthesia Type: 1% lidocaine with 1:100,000 epinephrine and a 1:10 solution of 8.4% sodium bicarbonate Repair Hemostasis (Optional): Electrodesiccation Brow Lift Text: A midfrontal incision was made medially to the defect to allow access to the tissues just superior to the left eyebrow. Following careful dissection inferiorly in a supraperiosteal plane to the level of the left eyebrow, several 3-0 monocryl sutures were used to resuspend the eyebrow orbicularis oculi muscular unit to the superior frontal bone periosteum. This resulted in an appropriate reapproximation of static eyebrow symmetry and correction of the left brow ptosis. Epidermal Closure: running Suturegard Intro: Intraoperative tissue expansion was performed, utilizing the SUTUREGARD device, in order to reduce wound tension. Suturegard Body: The suture ends were repeatedly re-tightened and re-clamped to achieve the desired tissue expansion. Hemigard Intro: Due to skin fragility and wound tension, it was decided to use HEMIGARD adhesive retention suture devices to permit a linear closure. The skin was cleaned and dried for a 6cm distance away from the wound. Excessive hair, if present, was removed to allow for adhesion. Hemigard Postcare Instructions: The HEMIGARD strips are to remain completely dry for at least 5-7 days. Donor Site Anesthesia Type: same as repair anesthesia Epidermal Closure Graft Donor Site (Optional): simple interrupted Graft Donor Site Bandage (Optional-Leave Blank If You Don't Want In Note): Steri-strips and a pressure bandage were applied to the donor site. Closure 2 Information: This tab is for additional flaps and grafts, including complex repair and grafts and complex repair and flaps. You can also specify a different location for the additional defect, if the location is the same you do not need to select a new one. We will insert the automated text for the repair you select below just as we do for solitary flaps and grafts. Please note that at this time if you select a location with a different insurance zone you will need to override the ICD10 and CPT if appropriate. Closure 3 Information: This tab is for additional flaps and grafts above and beyond our usual structured repairs.  Please note if you enter information here it will not currently bill and you will need to add the billing information manually. Wound Care: No ointment Dressing: dry sterile dressing Wound Care (No Sutures): Petrolatum Unna Boot Text: An Unna boot was placed to help immobilize the limb and facilitate more rapid healing. Home Suture Removal Text: Patient was provided instructions on removing sutures and will remove their sutures at home.  If they have any questions or difficulties they will call the office. Post-Care Instructions: I reviewed with the patient in detail post-care instructions. Patient is not to engage in any heavy lifting, exercise, or swimming for the next 14 days. Should the patient develop any fevers, chills, bleeding, severe pain patient will contact the office immediately. Pain Refusal Text: I offered to prescribe pain medication but the patient refused to take this medication. Mauc Instructions: By selecting yes to the question below the MAUC number will be added into the note.  This will be calculated automatically based on the diagnosis chosen, the size entered, the body zone selected (H,M,L) and the specific indications you chose. You will also have the option to override the Mohs AUC if you disagree with the automatically calculated number and this option is found in the Case Summary tab. Where Do You Want The Question To Include Opioid Counseling Located?: Case Summary Tab Eye Protection Verbiage: Before proceeding with the stage, a plastic scleral shield was inserted. The globe was anesthetized with a few drops of 1% lidocaine with 1:100,000 epinephrine. Then, an appropriate sized scleral shield was chosen and coated with lacrilube ointment. The shield was gently inserted and left in place for the duration of each stage. After the stage was completed, the shield was gently removed. Mohs Method Verbiage: The marked and patient-approved site was excised using the standard Mohs technique and the specimen was harvested as a microscopic controlled layer. Surgeon/Pathologist Verbiage (Will Incorporate Name Of Surgeon From Intro If Not Blank): operated in two distinct and integrated capacities as the surgeon and pathologist. Mohs Histo Method Verbiage: The excised tissue was then mapped, color-coded and submitted to the in-house Mohs lab for tissue processing using the Mohs protocol.\\n\\Karina histologic findings, including description and depth of tumor present at margins (if any) and presence or absence of perineural involvement, or scar, is noted on the Mohs map. Subsequent Stages Histo Method Verbiage: Using a similar technique to that described above, a thin layer of tissue was removed from all areas where tumor was visible on the previous stage.  The tissue was again oriented, mapped, dyed, and processed as above. Mohs Rapid Report Verbiage: The area of clinically evident tumor was marked with skin marking ink and appropriately hatched.  The initial incision was made following the Mohs approach through the skin.  The specimen was taken to the lab, divided into the necessary number of pieces, chromacoded and processed according to the Mohs protocol.  This was repeated in successive stages until a tumor free defect was achieved. Complex Repair Preamble Text (Leave Blank If You Do Not Want): Extensive wide undermining was performed. Intermediate Repair Preamble Text (Leave Blank If You Do Not Want): Undermining was performed with blunt dissection. Graft Cartilage Fenestration Text: The cartilage was fenestrated with a 2mm punch biopsy to help facilitate graft survival and healing. Non-Graft Cartilage Fenestration Text: The cartilage was fenestrated with a 2mm punch biopsy to help facilitate healing. Secondary Intention Text (Leave Blank If You Do Not Want): The defect will heal with secondary intention. No Repair - Repaired With Adjacent Surgical Defect Text (Leave Blank If You Do Not Want): After obtaining clear surgical margins the defect was repaired concurrently with another surgical defect which was in close approximation. Adjacent Tissue Transfer Text: The defect edges were debeveled with a #15 scalpel blade.  Given the location of the defect and the proximity to free margins an adjacent tissue transfer was deemed most appropriate.  Using a sterile surgical marker, an appropriate flap was drawn incorporating the defect and placing the expected incisions within the relaxed skin tension lines where possible.    The area thus outlined was incised deep to adipose tissue with a #15 scalpel blade.  The skin margins were undermined to an appropriate distance in all directions utilizing iris scissors. Advancement Flap (Single) Text: The defect edges were debeveled with a #15 scalpel blade.  Given the location of the defect and the proximity to free margins a single advancement flap was deemed most appropriate.  Using a sterile surgical marker, an appropriate advancement flap was drawn incorporating the defect and placing the expected incisions within the relaxed skin tension lines where possible.    The area thus outlined was incised deep to adipose tissue with a #15 scalpel blade.  The skin margins were undermined to an appropriate distance in all directions utilizing iris scissors. Advancement Flap (Double) Text: The defect edges were debeveled with a #15 scalpel blade.  Given the location of the defect and the proximity to free margins a double advancement flap was deemed most appropriate.  Using a sterile surgical marker, the appropriate advancement flaps were drawn incorporating the defect and placing the expected incisions within the relaxed skin tension lines where possible.    The area thus outlined was incised deep to adipose tissue with a #15 scalpel blade.  The skin margins were undermined to an appropriate distance in all directions utilizing iris scissors. Advancement-Rotation Flap Text: The defect edges were debeveled with a #15 scalpel blade.  Given the location of the defect, shape of the defect and the proximity to free margins an advancement-rotation flap was deemed most appropriate.  Using a sterile surgical marker, an appropriate flap was drawn incorporating the defect and placing the expected incisions within the relaxed skin tension lines where possible. The area thus outlined was incised deep to adipose tissue with a #15 scalpel blade.  The skin margins were undermined to an appropriate distance in all directions utilizing iris scissors. Alar Island Pedicle Flap Text: The defect edges were debeveled with a #15 scalpel blade.  Given the location of the defect, shape of the defect and the proximity to the alar rim an island pedicle advancement flap was deemed most appropriate.  Using a sterile surgical marker, an appropriate advancement flap was drawn incorporating the defect, outlining the appropriate donor tissue and placing the expected incisions within the nasal ala running parallel to the alar rim. The area thus outlined was incised with a #15 scalpel blade.  The skin margins were undermined minimally to an appropriate distance in all directions around the primary defect and laterally outward around the island pedicle utilizing iris scissors.  There was minimal undermining beneath the pedicle flap. A-T Advancement Flap Text: The defect edges were debeveled with a #15 scalpel blade.  Given the location of the defect, shape of the defect and the proximity to free margins an A-T advancement flap was deemed most appropriate.  Using a sterile surgical marker, an appropriate advancement flap was drawn incorporating the defect and placing the expected incisions within the relaxed skin tension lines where possible.    The area thus outlined was incised deep to adipose tissue with a #15 scalpel blade.  The skin margins were undermined to an appropriate distance in all directions utilizing iris scissors. Banner Transposition Flap Text: The defect edges were debeveled with a #15 scalpel blade.  Given the location of the defect and the proximity to free margins a Banner transposition flap was deemed most appropriate.  Using a sterile surgical marker, an appropriate flap drawn around the defect. The area thus outlined was incised deep to adipose tissue with a #15 scalpel blade.  The skin margins were undermined to an appropriate distance in all directions utilizing iris scissors. Bilateral Helical Rim Advancement Flap Text: The defect edges were debeveled with a #15 blade scalpel.  Given the location of the defect and the proximity to free margins (helical rim) a bilateral helical rim advancement flap was deemed most appropriate.  Using a sterile surgical marker, the appropriate advancement flaps were drawn incorporating the defect and placing the expected incisions between the helical rim and antihelix where possible.  The area thus outlined was incised through and through with a #15 scalpel blade.  With a skin hook and iris scissors, the flaps were gently and sharply undermined and freed up. Bilateral Rotation Flap Text: The defect edges were debeveled with a #15 scalpel blade. Given the location of the defect, shape of the defect and the proximity to free margins a bilateral rotation flap was deemed most appropriate. Using a sterile surgical marker, an appropriate rotation flap was drawn incorporating the defect and placing the expected incisions within the relaxed skin tension lines where possible. The area thus outlined was incised deep to adipose tissue with a #15 scalpel blade. The skin margins were undermined to an appropriate distance in all directions utilizing iris scissors. Following this, the designed flap was carried over into the primary defect and sutured into place. Bilobed Flap Text: The defect edges were debeveled with a #15 scalpel blade.  Given the location of the defect and the proximity to free margins a bilobe flap was deemed most appropriate.  Using a sterile surgical marker, an appropriate bilobe flap drawn around the defect.    The area thus outlined was incised deep to adipose tissue with a #15 scalpel blade.  The skin margins were undermined to an appropriate distance in all directions utilizing iris scissors. Bilobed Transposition Flap Text: The defect edges were debeveled with a #15 scalpel blade.  Given the location of the defect and the proximity to free margins a bilobed transposition flap was deemed most appropriate.  Using a sterile surgical marker, an appropriate bilobe flap drawn around the defect.    The area thus outlined was incised deep to adipose tissue with a #15 scalpel blade.  The skin margins were undermined to an appropriate distance in all directions utilizing iris scissors. Bi-Rhombic Flap Text: The defect edges were debeveled with a #15 scalpel blade.  Given the location of the defect and the proximity to free margins a bi-rhombic flap was deemed most appropriate.  Using a sterile surgical marker, an appropriate rhombic flap was drawn incorporating the defect. The area thus outlined was incised deep to adipose tissue with a #15 scalpel blade.  The skin margins were undermined to an appropriate distance in all directions utilizing iris scissors. Burow's Advancement Flap Text: The defect edges were debeveled with a #15 scalpel blade.  Given the location of the defect and the proximity to free margins a Burow's advancement flap was deemed most appropriate.  Using a sterile surgical marker, the appropriate advancement flap was drawn incorporating the defect and placing the expected incisions within the relaxed skin tension lines where possible.    The area thus outlined was incised deep to adipose tissue with a #15 scalpel blade.  The skin margins were undermined to an appropriate distance in all directions utilizing iris scissors. Chonodrocutaneous Helical Advancement Flap Text: The defect edges were debeveled with a #15 scalpel blade.  Given the location of the defect and the proximity to free margins a chondrocutaneous helical advancement flap was deemed most appropriate.  Using a sterile surgical marker, the appropriate advancement flap was drawn incorporating the defect and placing the expected incisions within the relaxed skin tension lines where possible.    The area thus outlined was incised deep to adipose tissue with a #15 scalpel blade.  The skin margins were undermined to an appropriate distance in all directions utilizing iris scissors. Crescentic Advancement Flap Text: The defect edges were debeveled with a #15 scalpel blade.  Given the location of the defect and the proximity to free margins a crescentic advancement flap was deemed most appropriate.  Using a sterile surgical marker, the appropriate advancement flap was drawn incorporating the defect and placing the expected incisions within the relaxed skin tension lines where possible.    The area thus outlined was incised deep to adipose tissue with a #15 scalpel blade.  The skin margins were undermined to an appropriate distance in all directions utilizing iris scissors. Dorsal Nasal Flap Text: The defect edges were debeveled with a #15 scalpel blade.  Given the location of the defect and the proximity to free margins a dorsal nasal flap was deemed most appropriate.  Using a sterile surgical marker, an appropriate dorsal nasal flap was drawn around the defect.    The area thus outlined was incised deep to adipose tissue with a #15 scalpel blade.  The skin margins were undermined to an appropriate distance in all directions utilizing iris scissors. Double Island Pedicle Flap Text: The defect edges were debeveled with a #15 scalpel blade.  Given the location of the defect, shape of the defect and the proximity to free margins a double island pedicle advancement flap was deemed most appropriate.  Using a sterile surgical marker, an appropriate advancement flap was drawn incorporating the defect, outlining the appropriate donor tissue and placing the expected incisions within the relaxed skin tension lines where possible.    The area thus outlined was incised deep to adipose tissue with a #15 scalpel blade.  The skin margins were undermined to an appropriate distance in all directions around the primary defect and laterally outward around the island pedicle utilizing iris scissors.  There was minimal undermining beneath the pedicle flap. Double O-Z Flap Text: The defect edges were debeveled with a #15 scalpel blade.  Given the location of the defect, shape of the defect and the proximity to free margins a Double O-Z flap was deemed most appropriate.  Using a sterile surgical marker, an appropriate transposition flap was drawn incorporating the defect and placing the expected incisions within the relaxed skin tension lines where possible. The area thus outlined was incised deep to adipose tissue with a #15 scalpel blade.  The skin margins were undermined to an appropriate distance in all directions utilizing iris scissors. Double O-Z Plasty Text: The defect edges were debeveled with a #15 scalpel blade.  Given the location of the defect, shape of the defect and the proximity to free margins a Double O-Z plasty (double transposition flap) was deemed most appropriate.  Using a sterile surgical marker, the appropriate transposition flaps were drawn incorporating the defect and placing the expected incisions within the relaxed skin tension lines where possible. The area thus outlined was incised deep to adipose tissue with a #15 scalpel blade.  The skin margins were undermined to an appropriate distance in all directions utilizing iris scissors.  Hemostasis was achieved with electrocautery.  The flaps were then transposed into place, one clockwise and the other counterclockwise, and anchored with interrupted buried subcutaneous sutures. Double Z Plasty Text: The lesion was extirpated to the level of the fat with a #15 scalpel blade. Given the location of the defect, shape of the defect and the proximity to free margins a double Z-plasty was deemed most appropriate for repair. Using a sterile surgical marker, the appropriate transposition arms of the double Z-plasty were drawn incorporating the defect and placing the expected incisions within the relaxed skin tension lines where possible. The area thus outlined was incised deep to adipose tissue with a #15 scalpel blade. The skin margins were undermined to an appropriate distance in all directions utilizing iris scissors. The opposing transposition arms were then transposed and carried over into place in opposite direction and anchored with interrupted buried subcutaneous sutures. Ear Star Wedge Flap Text: The defect edges were debeveled with a #15 blade scalpel.  Given the location of the defect and the proximity to free margins (helical rim) an ear star wedge flap was deemed most appropriate.  Using a sterile surgical marker, the appropriate flap was drawn incorporating the defect and placing the expected incisions between the helical rim and antihelix where possible.  The area thus outlined was incised through and through with a #15 scalpel blade. Flip-Flop Flap Text: The defect edges were debeveled with a #15 blade scalpel.  Given the location of the defect and the proximity to free margins a flip-flop flap was deemed most appropriate. Using a sterile surgical marker, the appropriate flap was drawn incorporating the defect and placing the expected incisions between the helical rim and antihelix where possible.  The area thus outlined was incised through and through with a #15 scalpel blade. Following this, the designed flap was carried over into the primary defect and sutured into place. Hatchet Flap Text: The defect edges were debeveled with a #15 scalpel blade.  Given the location of the defect, shape of the defect and the proximity to free margins a hatchet flap was deemed most appropriate.  Using a sterile surgical marker, an appropriate hatchet flap was drawn incorporating the defect and placing the expected incisions within the relaxed skin tension lines where possible.    The area thus outlined was incised deep to adipose tissue with a #15 scalpel blade.  The skin margins were undermined to an appropriate distance in all directions utilizing iris scissors. Helical Rim Advancement Flap Text: The defect edges were debeveled with a #15 blade scalpel.  Given the location of the defect and the proximity to free margins (helical rim) a double helical rim advancement flap was deemed most appropriate.  Using a sterile surgical marker, the appropriate advancement flaps were drawn incorporating the defect and placing the expected incisions between the helical rim and antihelix where possible.  The area thus outlined was incised through and through with a #15 scalpel blade.  With a skin hook and iris scissors, the flaps were gently and sharply undermined and freed up. H Plasty Text: Given the location of the defect, shape of the defect and the proximity to free margins a H-plasty was deemed most appropriate for repair.  Using a sterile surgical marker, the appropriate advancement arms of the H-plasty were drawn incorporating the defect and placing the expected incisions within the relaxed skin tension lines where possible. The area thus outlined was incised deep to adipose tissue with a #15 scalpel blade. The skin margins were undermined to an appropriate distance in all directions utilizing iris scissors.  The opposing advancement arms were then advanced into place in opposite direction and anchored with interrupted buried subcutaneous sutures. Island Pedicle Flap Text: The defect edges were debeveled with a #15 scalpel blade.  Given the location of the defect, shape of the defect and the proximity to free margins an island pedicle advancement flap was deemed most appropriate.  Using a sterile surgical marker, an appropriate advancement flap was drawn incorporating the defect, outlining the appropriate donor tissue and placing the expected incisions within the relaxed skin tension lines where possible.    The area thus outlined was incised deep to adipose tissue with a #15 scalpel blade.  The skin margins were undermined to an appropriate distance in all directions around the primary defect and laterally outward around the island pedicle utilizing iris scissors.  There was minimal undermining beneath the pedicle flap. Island Pedicle Flap With Canthal Suspension Text: The defect edges were debeveled with a #15 scalpel blade.  Given the location of the defect, shape of the defect and the proximity to free margins an island pedicle advancement flap was deemed most appropriate.  Using a sterile surgical marker, an appropriate advancement flap was drawn incorporating the defect, outlining the appropriate donor tissue and placing the expected incisions within the relaxed skin tension lines where possible. The area thus outlined was incised deep to adipose tissue with a #15 scalpel blade.  The skin margins were undermined to an appropriate distance in all directions around the primary defect and laterally outward around the island pedicle utilizing iris scissors.  There was minimal undermining beneath the pedicle flap. A suspension suture was placed in the canthal tendon to prevent tension and prevent ectropion. Island Pedicle Flap-Requiring Vessel Identification Text: The defect edges were debeveled with a #15 scalpel blade.  Given the location of the defect, shape of the defect and the proximity to free margins an island pedicle advancement flap was deemed most appropriate.  Using a sterile surgical marker, an appropriate advancement flap was drawn, based on the axial vessel mentioned above, incorporating the defect, outlining the appropriate donor tissue and placing the expected incisions within the relaxed skin tension lines where possible.    The area thus outlined was incised deep to adipose tissue with a #15 scalpel blade.  The skin margins were undermined to an appropriate distance in all directions around the primary defect and laterally outward around the island pedicle utilizing iris scissors.  There was minimal undermining beneath the pedicle flap. Keystone Flap Text: The defect edges were debeveled with a #15 scalpel blade.  Given the location of the defect, shape of the defect a keystone flap was deemed most appropriate.  Using a sterile surgical marker, an appropriate keystone flap was drawn incorporating the defect, outlining the appropriate donor tissue and placing the expected incisions within the relaxed skin tension lines where possible. The area thus outlined was incised deep to adipose tissue with a #15 scalpel blade.  The skin margins were undermined to an appropriate distance in all directions around the primary defect and laterally outward around the flap utilizing iris scissors. Melolabial Transposition Flap Text: The defect edges were debeveled with a #15 scalpel blade.  Given the location of the defect and the proximity to free margins a melolabial flap was deemed most appropriate.  Using a sterile surgical marker, an appropriate melolabial transposition flap was drawn incorporating the defect.    The area thus outlined was incised deep to adipose tissue with a #15 scalpel blade.  The skin margins were undermined to an appropriate distance in all directions utilizing iris scissors. Mercedes Flap Text: The defect edges were debeveled with a #15 scalpel blade.  Given the location of the defect, shape of the defect and the proximity to free margins a Mercedes flap was deemed most appropriate.  Using a sterile surgical marker, an appropriate advancement flap was drawn incorporating the defect and placing the expected incisions within the relaxed skin tension lines where possible. The area thus outlined was incised deep to adipose tissue with a #15 scalpel blade.  The skin margins were undermined to an appropriate distance in all directions utilizing iris scissors. Modified Advancement Flap Text: The defect edges were debeveled with a #15 scalpel blade.  Given the location of the defect, shape of the defect and the proximity to free margins a modified advancement flap was deemed most appropriate.  Using a sterile surgical marker, an appropriate advancement flap was drawn incorporating the defect and placing the expected incisions within the relaxed skin tension lines where possible.    The area thus outlined was incised deep to adipose tissue with a #15 scalpel blade.  The skin margins were undermined to an appropriate distance in all directions utilizing iris scissors. Mucosal Advancement Flap Text: Given the location of the defect, shape of the defect and the proximity to free margins a mucosal advancement flap was deemed most appropriate. Incisions were made with a 15 blade scalpel in the appropriate fashion along the cutaneous vermilion border and the mucosal lip. The remaining actinically damaged mucosal tissue was excised.  The mucosal advancement flap was then elevated to the gingival sulcus with care taken to preserve the neurovascular structures and advanced into the primary defect. Care was taken to ensure that precise realignment of the vermilion border was achieved. Muscle Hinge Flap Text: The defect edges were debeveled with a #15 scalpel blade.  Given the size, depth and location of the defect and the proximity to free margins a muscle hinge flap was deemed most appropriate.  Using a sterile surgical marker, an appropriate hinge flap was drawn incorporating the defect. The area thus outlined was incised with a #15 scalpel blade.  The skin margins were undermined to an appropriate distance in all directions utilizing iris scissors. Mustarde Flap Text: The defect edges were debeveled with a #15 scalpel blade.  Given the size, depth and location of the defect and the proximity to free margins a Mustarde flap was deemed most appropriate.  Using a sterile surgical marker, an appropriate flap was drawn incorporating the defect. The area thus outlined was incised with a #15 scalpel blade.  The skin margins were undermined to an appropriate distance in all directions utilizing iris scissors. Nasal Turnover Hinge Flap Text: The defect edges were debeveled with a #15 scalpel blade.  Given the size, depth, location of the defect and the defect being full thickness a nasal turnover hinge flap was deemed most appropriate.  Using a sterile surgical marker, an appropriate hinge flap was drawn incorporating the defect. The area thus outlined was incised with a #15 scalpel blade. The flap was designed to recreate the nasal mucosal lining and the alar rim. The skin margins were undermined to an appropriate distance in all directions utilizing iris scissors. Nasalis-Muscle-Based Myocutaneous Island Pedicle Flap Text: Using a #15 blade, an incision was made around the donor flap to the level of the nasalis muscle. Wide lateral undermining was then performed in both the subcutaneous plane above the nasalis muscle, and in a submuscular plane just above periosteum. This allowed the formation of a free nasalis muscle axial pedicle (based on the angular artery) which was still attached to the actual cutaneous flap, increasing its mobility and vascular viability. Hemostasis was obtained with pinpoint electrocoagulation. The flap was mobilized into position and the pivotal anchor points positioned and stabilized with buried interrupted sutures. Subcutaneous and dermal tissues were closed in a multilayered fashion with sutures. Tissue redundancies were excised, and the epidermal edges were apposed without significant tension and sutured with sutures. Nasalis Myocutaneous Flap Text: Using a #15 blade, an incision was made around the donor flap to the level of the nasalis muscle. Wide lateral undermining was then performed in both the subcutaneous plane above the nasalis muscle, and in a submuscular plane just above periosteum. This allowed the formation of a free nasalis muscle axial pedicle which was still attached to the actual cutaneous flap, increasing its mobility and vascular viability. Hemostasis was obtained with pinpoint electrocoagulation. The flap was mobilized into position and the pivotal anchor points positioned and stabilized with buried interrupted sutures. Subcutaneous and dermal tissues were closed in a multilayered fashion with sutures. Tissue redundancies were excised, and the epidermal edges were apposed without significant tension and sutured with sutures. Nasolabial Transposition Flap Text: The defect edges were debeveled with a #15 scalpel blade.  Given the size, depth and location of the defect and the proximity to free margins a nasolabial transposition flap was deemed most appropriate. Using a sterile surgical marker, an appropriate flap was drawn incorporating the defect. The area thus outlined was incised with a #15 scalpel blade. The skin margins were undermined to an appropriate distance in all directions utilizing iris scissors. Following this, the designed flap was carried into the primary defect and sutured into place. Orbicularis Oris Muscle Flap Text: The defect edges were debeveled with a #15 scalpel blade.  Given that the defect affected the competency of the oral sphincter an orbicularis oris muscle flap was deemed most appropriate to restore this competency and normal muscle function.  Using a sterile surgical marker, an appropriate flap was drawn incorporating the defect. The area thus outlined was incised with a #15 scalpel blade. O-T Advancement Flap Text: The defect edges were debeveled with a #15 scalpel blade.  Given the location of the defect, shape of the defect and the proximity to free margins an O-T advancement flap was deemed most appropriate.  Using a sterile surgical marker, an appropriate advancement flap was drawn incorporating the defect and placing the expected incisions within the relaxed skin tension lines where possible.    The area thus outlined was incised deep to adipose tissue with a #15 scalpel blade.  The skin margins were undermined to an appropriate distance in all directions utilizing iris scissors. O-T Plasty Text: The defect edges were debeveled with a #15 scalpel blade.  Given the location of the defect, shape of the defect and the proximity to free margins an O-T plasty was deemed most appropriate.  Using a sterile surgical marker, an appropriate O-T plasty was drawn incorporating the defect and placing the expected incisions within the relaxed skin tension lines where possible.    The area thus outlined was incised deep to adipose tissue with a #15 scalpel blade.  The skin margins were undermined to an appropriate distance in all directions utilizing iris scissors. O-L Flap Text: The defect edges were debeveled with a #15 scalpel blade.  Given the location of the defect, shape of the defect and the proximity to free margins an O-L flap was deemed most appropriate.  Using a sterile surgical marker, an appropriate advancement flap was drawn incorporating the defect and placing the expected incisions within the relaxed skin tension lines where possible.    The area thus outlined was incised deep to adipose tissue with a #15 scalpel blade.  The skin margins were undermined to an appropriate distance in all directions utilizing iris scissors. O-Z Flap Text: The defect edges were debeveled with a #15 scalpel blade.  Given the location of the defect, shape of the defect and the proximity to free margins an O-Z flap was deemed most appropriate.  Using a sterile surgical marker, an appropriate transposition flap was drawn incorporating the defect and placing the expected incisions within the relaxed skin tension lines where possible. The area thus outlined was incised deep to adipose tissue with a #15 scalpel blade.  The skin margins were undermined to an appropriate distance in all directions utilizing iris scissors. O-Z Plasty Text: The defect edges were debeveled with a #15 scalpel blade.  Given the location of the defect, shape of the defect and the proximity to free margins an O-Z plasty (double transposition flap) was deemed most appropriate.  Using a sterile surgical marker, the appropriate transposition flaps were drawn incorporating the defect and placing the expected incisions within the relaxed skin tension lines where possible.    The area thus outlined was incised deep to adipose tissue with a #15 scalpel blade.  The skin margins were undermined to an appropriate distance in all directions utilizing iris scissors.  Hemostasis was achieved with electrocautery.  The flaps were then transposed into place, one clockwise and the other counterclockwise, and anchored with interrupted buried subcutaneous sutures. Peng Advancement Flap Text: The defect edges were debeveled with a #15 scalpel blade.  Given the location of the defect, shape of the defect and the proximity to free margins a Peng advancement flap was deemed most appropriate.  Using a sterile surgical marker, an appropriate advancement flap was drawn incorporating the defect and placing the expected incisions within the relaxed skin tension lines where possible. The area thus outlined was incised deep to adipose tissue with a #15 scalpel blade.  The skin margins were undermined to an appropriate distance in all directions utilizing iris scissors. Rectangular Flap Text: The defect edges were debeveled with a #15 scalpel blade. Given the location of the defect and the proximity to free margins a rectangular flap was deemed most appropriate. Using a sterile surgical marker, an appropriate rectangular flap was drawn incorporating the defect. The area thus outlined was incised deep to adipose tissue with a #15 scalpel blade. The skin margins were undermined to an appropriate distance in all directions utilizing iris scissors. Following this, the designed flap was carried over into the primary defect and sutured into place. Rhombic Flap Text: The defect edges were debeveled with a #15 scalpel blade.  Given the location of the defect and the proximity to free margins a rhombic flap was deemed most appropriate.  Using a sterile surgical marker, an appropriate rhombic flap was drawn incorporating the defect.    The area thus outlined was incised deep to adipose tissue with a #15 scalpel blade.  The skin margins were undermined to an appropriate distance in all directions utilizing iris scissors. Rhomboid Transposition Flap Text: The defect edges were debeveled with a #15 scalpel blade.  Given the location of the defect and the proximity to free margins a rhomboid transposition flap was deemed most appropriate.  Using a sterile surgical marker, an appropriate rhomboid flap was drawn incorporating the defect.    The area thus outlined was incised deep to adipose tissue with a #15 scalpel blade.  The skin margins were undermined to an appropriate distance in all directions utilizing iris scissors. Rotation Flap Text: The defect edges were debeveled with a #15 scalpel blade.  Given the location of the defect, shape of the defect and the proximity to free margins a rotation flap was deemed most appropriate.  Using a sterile surgical marker, an appropriate rotation flap was drawn incorporating the defect and placing the expected incisions within the relaxed skin tension lines where possible.    The area thus outlined was incised deep to adipose tissue with a #15 scalpel blade.  The skin margins were undermined to an appropriate distance in all directions utilizing iris scissors. Spiral Flap Text: The defect edges were debeveled with a #15 scalpel blade.  Given the location of the defect, shape of the defect and the proximity to free margins a spiral flap was deemed most appropriate.  Using a sterile surgical marker, an appropriate rotation flap was drawn incorporating the defect and placing the expected incisions within the relaxed skin tension lines where possible. The area thus outlined was incised deep to adipose tissue with a #15 scalpel blade.  The skin margins were undermined to an appropriate distance in all directions utilizing iris scissors. Staged Advancement Flap Text: The defect edges were debeveled with a #15 scalpel blade.  Given the location of the defect, shape of the defect and the proximity to free margins a staged advancement flap was deemed most appropriate.  Using a sterile surgical marker, an appropriate advancement flap was drawn incorporating the defect and placing the expected incisions within the relaxed skin tension lines where possible. The area thus outlined was incised deep to adipose tissue with a #15 scalpel blade.  The skin margins were undermined to an appropriate distance in all directions utilizing iris scissors. Star Wedge Flap Text: The defect edges were debeveled with a #15 scalpel blade.  Given the location of the defect, shape of the defect and the proximity to free margins a star wedge flap was deemed most appropriate.  Using a sterile surgical marker, an appropriate rotation flap was drawn incorporating the defect and placing the expected incisions within the relaxed skin tension lines where possible. The area thus outlined was incised deep to adipose tissue with a #15 scalpel blade.  The skin margins were undermined to an appropriate distance in all directions utilizing iris scissors. Transposition Flap Text: The defect edges were debeveled with a #15 scalpel blade.  Given the location of the defect and the proximity to free margins a transposition flap was deemed most appropriate.  Using a sterile surgical marker, an appropriate transposition flap was drawn incorporating the defect.    The area thus outlined was incised deep to adipose tissue with a #15 scalpel blade.  The skin margins were undermined to an appropriate distance in all directions utilizing iris scissors. Trilobed Flap Text: The defect edges were debeveled with a #15 scalpel blade.  Given the location of the defect and the proximity to free margins a trilobed flap was deemed most appropriate.  Using a sterile surgical marker, an appropriate trilobed flap drawn around the defect.    The area thus outlined was incised deep to adipose tissue with a #15 scalpel blade.  The skin margins were undermined to an appropriate distance in all directions utilizing iris scissors. V-Y Flap Text: The defect edges were debeveled with a #15 scalpel blade.  Given the location of the defect, shape of the defect and the proximity to free margins a V-Y flap was deemed most appropriate.  Using a sterile surgical marker, an appropriate advancement flap was drawn incorporating the defect and placing the expected incisions within the relaxed skin tension lines where possible.    The area thus outlined was incised deep to adipose tissue with a #15 scalpel blade.  The skin margins were undermined to an appropriate distance in all directions utilizing iris scissors. V-Y Plasty Text: The defect edges were debeveled with a #15 scalpel blade.  Given the location of the defect, shape of the defect and the proximity to free margins an V-Y advancement flap was deemed most appropriate.  Using a sterile surgical marker, an appropriate advancement flap was drawn incorporating the defect and placing the expected incisions within the relaxed skin tension lines where possible.    The area thus outlined was incised deep to adipose tissue with a #15 scalpel blade.  The skin margins were undermined to an appropriate distance in all directions utilizing iris scissors. W Plasty Text: The lesion was extirpated to the level of the fat with a #15 scalpel blade.  Given the location of the defect, shape of the defect and the proximity to free margins a W-plasty was deemed most appropriate for repair.  Using a sterile surgical marker, the appropriate transposition arms of the W-plasty were drawn incorporating the defect and placing the expected incisions within the relaxed skin tension lines where possible.    The area thus outlined was incised deep to adipose tissue with a #15 scalpel blade.  The skin margins were undermined to an appropriate distance in all directions utilizing iris scissors.  The opposing transposition arms were then transposed into place in opposite direction and anchored with interrupted buried subcutaneous sutures. Z Plasty Text: The lesion was extirpated to the level of the fat with a #15 scalpel blade.  Given the location of the defect, shape of the defect and the proximity to free margins a Z-plasty was deemed most appropriate for repair.  Using a sterile surgical marker, the appropriate transposition arms of the Z-plasty were drawn incorporating the defect and placing the expected incisions within the relaxed skin tension lines where possible.    The area thus outlined was incised deep to adipose tissue with a #15 scalpel blade.  The skin margins were undermined to an appropriate distance in all directions utilizing iris scissors.  The opposing transposition arms were then transposed into place in opposite direction and anchored with interrupted buried subcutaneous sutures. Zygomaticofacial Flap Text: Given the location of the defect, shape of the defect and the proximity to free margins a zygomaticofacial flap was deemed most appropriate for repair.  Using a sterile surgical marker, the appropriate flap was drawn incorporating the defect and placing the expected incisions within the relaxed skin tension lines where possible. The area thus outlined was incised deep to adipose tissue with a #15 scalpel blade with preservation of a vascular pedicle.  The skin margins were undermined to an appropriate distance in all directions utilizing iris scissors.  The flap was then placed into the defect and anchored with interrupted buried subcutaneous sutures. Abbe Flap (Lower To Upper Lip) Text: The defect of the upper lip was assessed and measured.  Given the location and size of the defect, an Abbe flap was deemed most appropriate.  Using a sterile surgical marker, an appropriate Abbe flap was measured and drawn on the lower lip. Local anesthesia was then infiltrated. A scalpel was then used to incise the upper lip through and through the skin, vermilion, muscle and mucosa, leaving the flap pedicled on the opposite side.  The flap was then rotated and transferred to the lower lip defect.  The flap was then sutured into place with a three layer technique, closing the orbicularis oris muscle layer with subcutaneous buried sutures, followed by a mucosal layer and an epidermal layer. Abbe Flap (Upper To Lower Lip) Text: The defect of the lower lip was assessed and measured.  Given the location and size of the defect, an Abbe flap was deemed most appropriate.  Using a sterile surgical marker, an appropriate Abbe flap was measured and drawn on the upper lip. Local anesthesia was then infiltrated.  A scalpel was then used to incise the upper lip through and through the skin, vermilion, muscle and mucosa, leaving the flap pedicled on the opposite side.  The flap was then rotated and transferred to the lower lip defect.  The flap was then sutured into place with a three layer technique, closing the orbicularis oris muscle layer with subcutaneous buried sutures, followed by a mucosal layer and an epidermal layer. Cheek Interpolation Flap Text: A decision was made to reconstruct the defect utilizing an interpolation axial flap and a staged reconstruction.  A telfa template was made of the defect.  This telfa template was then used to outline the Cheek Interpolation flap.  The donor area for the pedicle flap was then injected with anesthesia.  The flap was excised through the skin and subcutaneous tissue down to the layer of the underlying musculature.  The interpolation flap was carefully excised within this deep plane to maintain its blood supply.  The edges of the donor site were undermined.   The donor site was closed in a primary fashion.  The pedicle was then rotated into position and sutured.  Once the tube was sutured into place, adequate blood supply was confirmed with blanching and refill.  The pedicle was then wrapped with xeroform gauze and dressed appropriately with a telfa and gauze bandage to ensure continued blood supply and protect the attached pedicle. Cheek-To-Nose Interpolation Flap Text: A decision was made to reconstruct the defect utilizing an interpolation axial flap and a staged reconstruction.  A telfa template was made of the defect.  This telfa template was then used to outline the Cheek-To-Nose Interpolation flap.  The donor area for the pedicle flap was then injected with anesthesia.  The flap was excised through the skin and subcutaneous tissue down to the layer of the underlying musculature.  The interpolation flap was carefully excised within this deep plane to maintain its blood supply.  The edges of the donor site were undermined.   The donor site was closed in a primary fashion.  The pedicle was then rotated into position and sutured.  Once the tube was sutured into place, adequate blood supply was confirmed with blanching and refill.  The pedicle was then wrapped with xeroform gauze and dressed appropriately with a telfa and gauze bandage to ensure continued blood supply and protect the attached pedicle. Estlander Flap (Lower To Upper Lip) Text: The defect of the lower lip was assessed and measured.  Given the location and size of the defect, an Estlander flap was deemed most appropriate.  Using a sterile surgical marker, an appropriate Estlander flap was measured and drawn on the upper lip. Local anesthesia was then infiltrated. A scalpel was then used to incise the lateral aspect of the flap, through skin, muscle and mucosa, leaving the flap pedicled medially.  The flap was then rotated and positioned to fill the lower lip defect.  The flap was then sutured into place with a three layer technique, closing the orbicularis oris muscle layer with subcutaneous buried sutures, followed by a mucosal layer and an epidermal layer. Interpolation Flap Text: A decision was made to reconstruct the defect utilizing an interpolation axial flap and a staged reconstruction.  A telfa template was made of the defect.  This telfa template was then used to outline the interpolation flap.  The donor area for the pedicle flap was then injected with anesthesia.  The flap was excised through the skin and subcutaneous tissue down to the layer of the underlying musculature.  The interpolation flap was carefully excised within this deep plane to maintain its blood supply.  The edges of the donor site were undermined.   The donor site was closed in a primary fashion.  The pedicle was then rotated into position and sutured.  Once the tube was sutured into place, adequate blood supply was confirmed with blanching and refill.  The pedicle was then wrapped with xeroform gauze and dressed appropriately with a telfa and gauze bandage to ensure continued blood supply and protect the attached pedicle. Melolabial Interpolation Flap Text: A decision was made to reconstruct the defect utilizing an interpolation axial flap and a staged reconstruction.  A telfa template was made of the defect.  This telfa template was then used to outline the melolabial interpolation flap.  The donor area for the pedicle flap was then injected with anesthesia.  The flap was excised through the skin and subcutaneous tissue down to the layer of the underlying musculature.  The pedicle flap was carefully excised within this deep plane to maintain its blood supply.  The edges of the donor site were undermined.   The donor site was closed in a primary fashion.  The pedicle was then rotated into position and sutured.  Once the tube was sutured into place, adequate blood supply was confirmed with blanching and refill.  The pedicle was then wrapped with xeroform gauze and dressed appropriately with a telfa and gauze bandage to ensure continued blood supply and protect the attached pedicle. Mastoid Interpolation Flap Text: A decision was made to reconstruct the defect utilizing an interpolation axial flap and a staged reconstruction.  A telfa template was made of the defect.  This telfa template was then used to outline the mastoid interpolation flap.  The donor area for the pedicle flap was then injected with anesthesia.  The flap was excised through the skin and subcutaneous tissue down to the layer of the underlying musculature.  The pedicle flap was carefully excised within this deep plane to maintain its blood supply.  The edges of the donor site were undermined.   The donor site was closed in a primary fashion.  The pedicle was then rotated into position and sutured.  Once the tube was sutured into place, adequate blood supply was confirmed with blanching and refill.  The pedicle was then wrapped with xeroform gauze and dressed appropriately with a telfa and gauze bandage to ensure continued blood supply and protect the attached pedicle. Paramedian Forehead Flap Text: A decision was made to reconstruct the defect utilizing an interpolation axial flap and a staged reconstruction.  A telfa template was made of the defect.  This telfa template was then used to outline the paramedian forehead pedicle flap.  The donor area for the pedicle flap was then injected with anesthesia.  The flap was excised through the skin and subcutaneous tissue down to the layer of the underlying musculature.  The pedicle flap was carefully excised within this deep plane to maintain its blood supply.  The edges of the donor site were undermined.   The donor site was closed in a primary fashion.  The pedicle was then rotated into position and sutured.  Once the tube was sutured into place, adequate blood supply was confirmed with blanching and refill.  The pedicle was then wrapped with xeroform gauze and dressed appropriately with a telfa and gauze bandage to ensure continued blood supply and protect the attached pedicle. Posterior Auricular Interpolation Flap Text: A decision was made to reconstruct the defect utilizing an interpolation axial flap and a staged reconstruction.  A telfa template was made of the defect.  This telfa template was then used to outline the posterior auricular interpolation flap.  The donor area for the pedicle flap was then injected with anesthesia.  The flap was excised through the skin and subcutaneous tissue down to the layer of the underlying musculature.  The pedicle flap was carefully excised within this deep plane to maintain its blood supply.  The edges of the donor site were undermined.   The donor site was closed in a primary fashion.  The pedicle was then rotated into position and sutured.  Once the tube was sutured into place, adequate blood supply was confirmed with blanching and refill.  The pedicle was then wrapped with xeroform gauze and dressed appropriately with a telfa and gauze bandage to ensure continued blood supply and protect the attached pedicle. Cheiloplasty (Complex) Text: A decision was made to reconstruct the defect with a  cheiloplasty.   Additional orbicularis oris muscle was excised with a 15 blade scalpel.  The defect was converted into a full thickness wedge to facilitate a better cosmetic result.  Hemostasis was carefully obtained with electrodesiccation.  Next, a trilaminar repair was performed: The mucosa was first reapproximated using a buried canal stitch. Next, the orbicularis oris, superficial fascia, adipose and dermis were reapproximated.  After the deeper layers were approximated the epidermis was reapproximated with particular care given to realign the vermilion border. Cheiloplasty (Less Than 50%) Text: A decision was made to reconstruct the defect with a  cheiloplasty.  The defect was undermined extensively.  Additional orbicularis oris muscle was excised with a 15 blade scalpel.  The defect was converted into a full thickness wedge, of less than 50% of the vertical height of the lip, to facilite a better cosmetic result.  Small vessels were then tied off with 5-0 monocyrl. The orbicularis oris, superficial fascia, adipose and dermis were then reapproximated.  After the deeper layers were approximated the epidermis was reapproximated with particular care given to realign the vermilion border. Ear Wedge Repair Text: A wedge excision was completed by carrying down an excision through the full thickness of the ear and cartilage with an inward facing Burow's triangle. The wound was then closed in a layered fashion. Full Thickness Lip Wedge Repair (Flap) Text: Given the location of the defect and the proximity to free margins a full thickness wedge repair was deemed most appropriate.  Using a sterile surgical marker, the appropriate repair was drawn incorporating the defect and placing the expected incisions perpendicular to the vermilion border.  The vermilion border was also meticulously outlined to ensure appropriate reapproximation during the repair.  The area thus outlined was incised through and through with a #15 scalpel blade.  The muscularis and dermis were reaproximated with deep sutures following hemostasis. Care was taken to realign the vermilion border before proceeding with the superficial closure.  Once the vermilion was realigned the superfical and mucosal closure was finished. Burow's Graft Text: The defect edges were debeveled with a #15 scalpel blade.  Given the location of the defect, shape of the defect, the proximity to free margins and the presence of a standing cone deformity a Burow's skin graft was deemed most appropriate. The standing cone was removed and this tissue was then trimmed to the shape of the primary defect. The adipose tissue was also removed until only dermis and epidermis were left.  The skin margins of the secondary defect were undermined to an appropriate distance in all directions utilizing iris scissors.  The secondary defect was closed with interrupted buried subcutaneous sutures.  The skin edges were then re-apposed with running  sutures.  The skin graft was then placed in the primary defect and oriented appropriately. Cartilage Graft Text: The defect edges were debeveled with a #15 scalpel blade.  Given the location of the defect, shape of the defect, the fact the defect involved a full thickness cartilage defect a cartilage graft was deemed most appropriate.  An appropriate donor site was identified, cleansed, and anesthetized. The cartilage graft was then harvested and transferred to the recipient site, oriented appropriately and then sutured into place.  The secondary defect was then repaired using a primary closure. Composite Graft Text: The defect edges were debeveled with a #15 scalpel blade.  Given the location of the defect, shape of the defect, the proximity to free margins and the fact the defect was full thickness a composite graft was deemed most appropriate.  The defect was outline and then transferred to the donor site.  A full thickness graft was then excised from the donor site. The graft was then placed in the primary defect, oriented appropriately and then sutured into place.  The secondary defect was then repaired using a primary closure. Epidermal Autograft Text: The defect edges were debeveled with a #15 scalpel blade.  Given the location of the defect, shape of the defect and the proximity to free margins an epidermal autograft was deemed most appropriate.  Using a sterile surgical marker, the primary defect shape was transferred to the donor site. The epidermal graft was then harvested.  The skin graft was then placed in the primary defect and oriented appropriately. Dermal Autograft Text: The defect edges were debeveled with a #15 scalpel blade.  Given the location of the defect, shape of the defect and the proximity to free margins a dermal autograft was deemed most appropriate.  Using a sterile surgical marker, the primary defect shape was transferred to the donor site. The area thus outlined was incised deep to adipose tissue with a #15 scalpel blade.  The harvested graft was then trimmed of adipose and epidermal tissue until only dermis was left.  The skin graft was then placed in the primary defect and oriented appropriately. Ftsg Text: The defect edges were debeveled with a #15 scalpel blade.  Given the location of the defect, shape of the defect and the proximity to free margins a full thickness skin graft was deemed most appropriate.  Using a sterile surgical marker, the primary defect shape was transferred to the donor site. The area thus outlined was incised deep to adipose tissue with a #15 scalpel blade.  The harvested graft was then trimmed of adipose tissue until only dermis and epidermis was left.  The skin margins of the secondary defect were undermined to an appropriate distance in all directions utilizing iris scissors.  The secondary defect was closed with interrupted buried subcutaneous sutures.  The skin edges were then re-apposed with running  sutures.  The skin graft was then placed in the primary defect and oriented appropriately. Pinch Graft Text: The defect edges were debeveled with a #15 scalpel blade. Given the location of the defect, shape of the defect and the proximity to free margins a pinch graft was deemed most appropriate. Using a sterile surgical marker, the primary defect shape was transferred to the donor site. The area thus outlined was incised deep to adipose tissue with a #15 scalpel blade.  The harvested graft was then trimmed of adipose tissue until only dermis and epidermis was left. The skin margins of the secondary defect were undermined to an appropriate distance in all directions utilizing iris scissors.  The secondary defect was closed with interrupted buried subcutaneous sutures.  The skin edges were then re-apposed with running  sutures.  The skin graft was then placed in the primary defect and oriented appropriately. Skin Substitute Text: The defect edges were debeveled with a #15 scalpel blade.  Given the location of the defect, shape of the defect and the proximity to free margins a skin substitute graft was deemed most appropriate.  The graft material was trimmed to fit the size of the defect. The graft was then placed in the primary defect and oriented appropriately. Split-Thickness Skin Graft Text: The defect edges were debeveled with a #15 scalpel blade.  Given the location of the defect, shape of the defect and the proximity to free margins a split thickness skin graft was deemed most appropriate.  Using a sterile surgical marker, the primary defect shape was transferred to the donor site. The split thickness graft was then harvested.  The skin graft was then placed in the primary defect and oriented appropriately. Tissue Cultured Epidermal Autograft Text: The defect edges were debeveled with a #15 scalpel blade.  Given the location of the defect, shape of the defect and the proximity to free margins a tissue cultured epidermal autograft was deemed most appropriate.  The graft was then trimmed to fit the size of the defect.  The graft was then placed in the primary defect and oriented appropriately. Xenograft Text: The defect edges were debeveled with a #15 scalpel blade.  Given the location of the defect, shape of the defect and the proximity to free margins a xenograft was deemed most appropriate.  The graft was then trimmed to fit the size of the defect.  The graft was then placed in the primary defect and oriented appropriately. Complex Repair And Flap Additional Text (Will Appearing After The Standard Complex Repair Text): The complex repair was not sufficient to completely close the primary defect. The remaining additional defect was repaired with the flap mentioned below. Complex Repair And Graft Additional Text (Will Appearing After The Standard Complex Repair Text): The complex repair was not sufficient to completely close the primary defect. The remaining additional defect was repaired with the graft mentioned below. Eyelid Full Thickness Repair - [id number]: The eyelid defect was full thickness which required a wedge repair of the eyelid. Special care was taken to ensure that the eyelid margin was realligned when placing sutures. Eyelid Partial Thickness Repair - 89134: The eyelid defect was partial thickness which required a wedge repair of the eyelid. Special care was taken to ensure that the eyelid margin was realligned when placing sutures. Intermediate Repair And Flap Additional Text (Will Appearing After The Standard Complex Repair Text): The intermediate repair was not sufficient to completely close the primary defect. The remaining additional defect was repaired with the flap mentioned below. Intermediate Repair And Graft Additional Text (Will Appearing After The Standard Complex Repair Text): The intermediate repair was not sufficient to completely close the primary defect. The remaining additional defect was repaired with the graft mentioned below. Localized Dermabrasion With 15 Blade Text: The patient was draped in routine manner.  Localized dermabrasion using a 15 blade was performed in routine manner to papillary dermis. This spot dermabrasion is being performed to complete skin cancer reconstruction. It also will eliminate the other sun damaged precancerous cells that are known to be part of the regional effect of a lifetime's worth of sun exposure. This localized dermabrasion is therapeutic and should not be considered cosmetic in any regard. Localized Dermabrasion With Sand Papertext: The patient was draped in routine manner.  Localized dermabrasion using sterile sand paper was performed in routine manner to papillary dermis. This spot dermabrasion is being performed to complete skin cancer reconstruction. It also will eliminate the other sun damaged precancerous cells that are known to be part of the regional effect of a lifetime's worth of sun exposure. This localized dermabrasion is therapeutic and should not be considered cosmetic in any regard. Localized Dermabrasion With Wire Brush Text: The patient was draped in routine manner.  Localized dermabrasion using 3 x 17 mm wire brush was performed in routine manner to papillary dermis. This spot dermabrasion is being performed to complete skin cancer reconstruction. It also will eliminate the other sun damaged precancerous cells that are known to be part of the regional effect of a lifetime's worth of sun exposure. This localized dermabrasion is therapeutic and should not be considered cosmetic in any regard. Purse String (Simple) Text: Given the location of the defect and the characteristics of the surrounding skin a purse string closure was deemed most appropriate.  Undermining was performed circumfirentially around the surgical defect.  A purse string suture was then placed and tightened. Purse String (Intermediate) Text: Given the location of the defect and the characteristics of the surrounding skin a purse string intermediate closure was deemed most appropriate.  Undermining was performed circumfirentially around the surgical defect.  A purse string suture was then placed and tightened. Partial Purse String (Simple) Text: Given the location of the defect and the characteristics of the surrounding skin a simple purse string closure was deemed most appropriate.  Undermining was performed circumfirentially around the surgical defect.  A purse string suture was then placed and tightened. Wound tension only allowed a partial closure of the circular defect. Partial Purse String (Intermediate) Text: Given the location of the defect and the characteristics of the surrounding skin an intermediate purse string closure was deemed most appropriate.  Undermining was performed circumfirentially around the surgical defect.  A purse string suture was then placed and tightened. Wound tension only allowed a partial closure of the circular defect. Tarsorrhaphy Text: A tarsorrhaphy was performed using Frost sutures. Manual Repair Warning Statement: We plan on removing the manually selected variable below in favor of our much easier automatic structured text blocks found in the previous tab. We decided to do this to help make the flow better and give you the full power of structured data. Manual selection is never going to be ideal in our platform and I would encourage you to avoid using manual selection from this point on, especially since I will be sunsetting this feature. It is important that you do one of two things with the customized text below. First, you can save all of the text in a word file so you can have it for future reference. Second, transfer the text to the appropriate area in the Library tab. Lastly, if there is a flap or graft type which we do not have you need to let us know right away so I can add it in before the variable is hidden. No need to panic, we plan to give you roughly 6 months to make the change. Same Histology In Subsequent Stages Text: The pattern and morphology of the tumor is as described in the first stage. No Residual Tumor Seen Histology Text: There were no malignant cells seen in the sections examined. Inflammation Suggestive Of Cancer Camouflage Histology Text: There was a dense lymphocytic infiltrate which prevented adequate histologic evaluation of adjacent structures. Incidental Superficial Basal Cell Carcinoma Histology Text: Nests of atypical basaloid nests were noted along the basilar epidermis, consistent with superficial BCC. Incidental Squamous Cell Carcinoma In Situ Histology Text: Foci of full thickness keratinocytic atypia were noted along the peripheral margin. Incidental Actinic Keratosis Histology Text: Foci of partial thickness atypia were noted along the peripheral margin Bcc Histology Text: There were numerous aggregates of basaloid cells. Bcc Infiltrative Histology Text: There were numerous aggregates of basaloid cells demonstrating an infiltrative pattern. Mart-1 - Positive Histology Text: MART-1 staining demonstrates areas of higher density and clustering of melanocytes with Pagetoid spread upwards within the epidermis. The surgical margins are positive for tumor cells. Mart-1 - Negative Histology Text: MART-1 staining demonstrates a normal density and pattern of melanocytes along the dermal-epidermal junction. The surgical margins are negative for tumor cells. Information: Selecting Yes will display possible errors in your note based on the variables you have selected. This validation is only offered as a suggestion for you. PLEASE NOTE THAT THE VALIDATION TEXT WILL BE REMOVED WHEN YOU FINALIZE YOUR NOTE. IF YOU WANT TO FAX A PRELIMINARY NOTE YOU WILL NEED TO TOGGLE THIS TO 'NO' IF YOU DO NOT WANT IT IN YOUR FAXED NOTE. Bill 59 Modifier?: No - Continue to Bill 79 Modifier

## 2025-05-14 ENCOUNTER — APPOINTMENT (OUTPATIENT)
Dept: SURGICAL ONCOLOGY | Facility: CLINIC | Age: 40
End: 2025-05-14
Payer: COMMERCIAL

## 2025-05-14 PROCEDURE — 99214 OFFICE O/P EST MOD 30 MIN: CPT | Mod: 95

## 2025-09-17 ENCOUNTER — APPOINTMENT (OUTPATIENT)
Dept: SURGICAL ONCOLOGY | Facility: CLINIC | Age: 40
End: 2025-09-17

## (undated) DEVICE — SPECIMEN CONTAINER 100ML

## (undated) DEVICE — GLV 7 PROTEXIS (WHITE)

## (undated) DEVICE — POSITIONER FOAM HEAD CRADLE (PINK)

## (undated) DEVICE — XI VESSEL SEALER

## (undated) DEVICE — ELCTR BOVIE BLADE 3/4" EXTENDED LENGTH 6"

## (undated) DEVICE — GLV 6.5 PROTEXIS (WHITE)

## (undated) DEVICE — POSITIONER FOAM EGG CRATE ULNAR 2PCS (PINK)

## (undated) DEVICE — XI ARM FORCEP PROGRASP 8MM

## (undated) DEVICE — POSITIONER PINK PAD PIGAZZI SYSTEM

## (undated) DEVICE — XI ARM NEEDLE DRIVER SUTURECUT MEGA 8MM

## (undated) DEVICE — SUT PROLENE 4-0 36" SH

## (undated) DEVICE — SOL IRR POUR NS 0.9% 500ML

## (undated) DEVICE — SUT MONOCRYL 4-0 27" PS-2 UNDYED

## (undated) DEVICE — SUT VICRYL 0 27" UR-6

## (undated) DEVICE — TUBING INSUFFLATION LAP FILTER 10FT

## (undated) DEVICE — XI DRAPE COLUMN

## (undated) DEVICE — TUBING AIRSEAL TRI-LUMEN FILTERED

## (undated) DEVICE — SUT POLYSORB 0 60" TIES UNDYED

## (undated) DEVICE — APPLICATOR VISTASEAL LAP DUAL 45CM FLEX

## (undated) DEVICE — TROCAR COVIDIEN BLUNT TIP HASSAN 12MM

## (undated) DEVICE — VENODYNE/SCD SLEEVE CALF MEDIUM

## (undated) DEVICE — XI OBTURATOR OPTICAL BLADELESS 8MM

## (undated) DEVICE — PROBE LAP ARGON BEAM 5MM

## (undated) DEVICE — POSITIONER PURPLE ARM ONE STEP (LARGE)

## (undated) DEVICE — XI ARM GRASPER TIP UP FENESTRATED

## (undated) DEVICE — XI ARM FORCEP FENESTRATED BIPOLAR 8MM

## (undated) DEVICE — PREP CHLORAPREP HI-LITE ORANGE 26ML

## (undated) DEVICE — TROCAR SURGIQUEST AIRSEAL 12MMX100MM

## (undated) DEVICE — XI TIP COVER

## (undated) DEVICE — TROCAR APPLIED MEDICAL KII BALLOON BLUNT TIP 12MM X 100MM

## (undated) DEVICE — PACK ROBOTIC LIJ

## (undated) DEVICE — XI DRAPE ARM

## (undated) DEVICE — XI ARM SCISSOR MONO CURVED

## (undated) DEVICE — FOLEY TRAY 16FR 5CC LTX UMETER CLOSED

## (undated) DEVICE — SOL IRR POUR H2O 250ML

## (undated) DEVICE — XI SEAL UNIV 5- 8 MM

## (undated) DEVICE — D HELP - CLEARVIEW CLEARIFY SYSTEM

## (undated) DEVICE — BLADE SCALPEL SAFETYLOCK #10

## (undated) DEVICE — GLV 8.5 PROTEXIS (WHITE)

## (undated) DEVICE — WARMING BLANKET UPPER ADULT

## (undated) DEVICE — SUT SILK 2-0 30" SH

## (undated) DEVICE — BASIN SET SINGLE

## (undated) DEVICE — XI ARM CLIP APPLIER LARGE

## (undated) DEVICE — DRAPE IOBAN 23" X 23"

## (undated) DEVICE — GLV 7.5 PROTEXIS (WHITE)

## (undated) DEVICE — TUBING STRYKEFLOW II SUCTION / IRRIGATOR

## (undated) DEVICE — TUBING SUCTION 20FT

## (undated) DEVICE — HANDSET ARGON

## (undated) DEVICE — GLV 8 PROTEXIS (WHITE)

## (undated) DEVICE — WARMING BLANKET LOWER ADULT

## (undated) DEVICE — STAPLER COVIDIEN ENDO GIA STANDARD HANDLE

## (undated) DEVICE — TAPE SILK 3"